# Patient Record
Sex: MALE | Race: BLACK OR AFRICAN AMERICAN | NOT HISPANIC OR LATINO | Employment: FULL TIME | URBAN - METROPOLITAN AREA
[De-identification: names, ages, dates, MRNs, and addresses within clinical notes are randomized per-mention and may not be internally consistent; named-entity substitution may affect disease eponyms.]

---

## 2023-09-06 ENCOUNTER — OFFICE VISIT (OUTPATIENT)
Dept: INTERNAL MEDICINE | Facility: CLINIC | Age: 63
End: 2023-09-06

## 2023-09-06 VITALS
HEART RATE: 81 BPM | OXYGEN SATURATION: 96 % | WEIGHT: 281.6 LBS | HEIGHT: 75 IN | SYSTOLIC BLOOD PRESSURE: 142 MMHG | DIASTOLIC BLOOD PRESSURE: 84 MMHG | TEMPERATURE: 97.1 F | BODY MASS INDEX: 35.01 KG/M2 | RESPIRATION RATE: 18 BRPM

## 2023-09-06 DIAGNOSIS — M54.6 CHRONIC BILATERAL THORACIC BACK PAIN: ICD-10-CM

## 2023-09-06 DIAGNOSIS — F32.89 OTHER DEPRESSION: ICD-10-CM

## 2023-09-06 DIAGNOSIS — I10 PRIMARY HYPERTENSION: ICD-10-CM

## 2023-09-06 DIAGNOSIS — M79.641 RIGHT HAND PAIN: ICD-10-CM

## 2023-09-06 DIAGNOSIS — Z12.11 COLON CANCER SCREENING: ICD-10-CM

## 2023-09-06 DIAGNOSIS — M10.9 GOUT, UNSPECIFIED CAUSE, UNSPECIFIED CHRONICITY, UNSPECIFIED SITE: ICD-10-CM

## 2023-09-06 DIAGNOSIS — G89.29 CHRONIC BILATERAL THORACIC BACK PAIN: ICD-10-CM

## 2023-09-06 DIAGNOSIS — Z00.00 ENCOUNTER FOR MEDICAL EXAMINATION TO ESTABLISH CARE: Primary | ICD-10-CM

## 2023-09-06 RX ORDER — MOMETASONE FUROATE AND FORMOTEROL FUMARATE DIHYDRATE 100; 5 UG/1; UG/1
1 AEROSOL RESPIRATORY (INHALATION)
Qty: 13 G | Refills: 1 | Status: SHIPPED | OUTPATIENT
Start: 2023-09-06

## 2023-09-06 RX ORDER — MOMETASONE FUROATE AND FORMOTEROL FUMARATE DIHYDRATE 100; 5 UG/1; UG/1
1 AEROSOL RESPIRATORY (INHALATION)
COMMUNITY
Start: 2018-03-06 | End: 2023-09-06 | Stop reason: SDUPTHER

## 2023-09-06 RX ORDER — ALLOPURINOL 300 MG/1
300 TABLET ORAL DAILY
Qty: 90 TABLET | Refills: 1 | Status: SHIPPED | OUTPATIENT
Start: 2023-09-06
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
## 2023-09-25 PROBLEM — I10 PRIMARY HYPERTENSION: Status: ACTIVE | Noted: 2023-09-25

## 2023-09-25 PROBLEM — M10.9 GOUT: Status: ACTIVE | Noted: 2023-09-25

## 2023-09-25 PROBLEM — M79.641 RIGHT HAND PAIN: Status: ACTIVE | Noted: 2023-09-25

## 2023-09-25 PROBLEM — M54.6 CHRONIC BILATERAL THORACIC BACK PAIN: Status: ACTIVE | Noted: 2023-09-25

## 2023-09-25 PROBLEM — G89.29 CHRONIC BILATERAL THORACIC BACK PAIN: Status: ACTIVE | Noted: 2023-09-25

## 2023-09-25 PROBLEM — F32.A DEPRESSION: Status: ACTIVE | Noted: 2023-09-25

## 2023-10-25 ENCOUNTER — OFFICE VISIT (OUTPATIENT)
Dept: INTERNAL MEDICINE | Facility: CLINIC | Age: 63
End: 2023-10-25
Payer: COMMERCIAL

## 2023-10-25 VITALS
DIASTOLIC BLOOD PRESSURE: 84 MMHG | TEMPERATURE: 97.9 F | WEIGHT: 273.2 LBS | HEART RATE: 80 BPM | BODY MASS INDEX: 33.97 KG/M2 | SYSTOLIC BLOOD PRESSURE: 130 MMHG | HEIGHT: 75 IN | OXYGEN SATURATION: 97 %

## 2023-10-25 DIAGNOSIS — Z23 IMMUNIZATION DUE: ICD-10-CM

## 2023-10-25 DIAGNOSIS — I10 PRIMARY HYPERTENSION: Primary | ICD-10-CM

## 2023-10-25 DIAGNOSIS — M79.641 RIGHT HAND PAIN: ICD-10-CM

## 2023-10-25 RX ORDER — LOSARTAN POTASSIUM 100 MG/1
100 TABLET ORAL DAILY
Qty: 90 TABLET | Refills: 1 | Status: SHIPPED | OUTPATIENT
Start: 2023-10-25

## 2023-10-25 NOTE — PROGRESS NOTES
Chief Complaint  Hypertension    Subjective            Eva Casas presents to [facility]    Hypertension :  Chronic, in good control.   He does check his bp at home, sbp runs in the high 120's/<85. [facility]     Right hand pain:  Subacute, referred to physical therapy, in for 3 weeks.   States when he goes to reach he no longer has the shooting pain that goes up his arms.   States he still gets the numbness over the tips of his thumb and index finger.     Chronic back pain:  Pt was referred to pain management as well at last visit.   Had trigger point injection, and has follow up appointment.         Past Medical History:   Diagnosis Date    Borderline diabetic     Borderline high cholesterol     Gout     Hypertension        Allergies:   No Known Allergies       History reviewed. No pertinent surgical history.       Social History     Socioeconomic History    Marital status:    Tobacco Use    Smoking status: Never     Passive exposure: Never    Smokeless tobacco: Never   Vaping Use    Vaping Use: Never used   Substance and Sexual Activity    Alcohol use: Never    Drug use: Never    Sexual activity: Defer         History reviewed. No pertinent family history.       Health Maintenance Due   Topic Date Due    COLORECTAL CANCER SCREENING  Never done    TDAP/TD VACCINES (1 - Tdap) Never done    ZOSTER VACCINE (1 of 2) Never done    COVID-19 Vaccine (3 - 2023-24 season) 09/01/2023    HEPATITIS C SCREENING  Never done    ANNUAL PHYSICAL  Never done            Current Outpatient Medications:     allopurinol (ZYLOPRIM) 300 MG tablet, Take 1 tablet by mouth Daily., Disp: 90 tablet, Rfl: 1    atorvastatin (LIPITOR) 10 MG tablet, Take 1 tablet by mouth Daily., Disp: , Rfl:     cetirizine (zyrTEC) 10 MG tablet, Take 1 tablet by mouth Daily., Disp: , Rfl:     cholecalciferol (VITAMIN D3) 10 MCG (400 UNIT) tablet, Take 1 tablet by mouth  "Daily., Disp: , Rfl:     colchicine 0.6 MG tablet, Take 1 tablet by mouth Daily., Disp: , Rfl:     folic acid (FOLVITE) 1 MG tablet, Take 1 tablet by mouth Daily., Disp: , Rfl:     losartan (COZAAR) 100 MG tablet, Take 1 tablet by mouth Daily., Disp: 90 tablet, Rfl: 1    metFORMIN (GLUCOPHAGE) 500 MG tablet, Take 1 tablet by mouth 2 (Two) Times a Day With Meals., Disp: , Rfl:     mometasone-formoterol (Dulera) 100-5 MCG/ACT inhaler, Inhale 1 puff 2 (Two) Times a Day., Disp: 13 g, Rfl: 1    triamterene-hydrochlorothiazide (MAXZIDE) 75-50 MG per tablet, Take 1 tablet by mouth Daily., Disp: , Rfl:     erythromycin (ROMYCIN) 5 MG/GM ophthalmic ointment, Administer  to the right eye Every 6 (Six) Hours While Awake for 7 days., Disp: 1 g, Rfl: 0      Immunization History   Administered Date(s) Administered    COVID-19 (PFIZER) Purple Cap Monovalent 03/29/2021, 04/17/2021    Fluzone (or Fluarix & Flulaval for VFC) >6mos 10/25/2023         Review of Systems       Objective       Vitals:    10/25/23 1528   BP: 130/84   Pulse: 80   Temp: 97.9 °F (36.6 °C)   SpO2: 97%   Weight: 124 kg (273 lb 3.2 oz)   Height: 190.5 cm (75\")     Body mass index is 34.15 kg/m².      Physical Exam  Vitals reviewed.   Constitutional:       Appearance: Normal appearance.   HENT:      Head: Normocephalic and atraumatic.      Nose: Nose normal.   Eyes:      Extraocular Movements: Extraocular movements intact.      Conjunctiva/sclera: Conjunctivae normal.   Cardiovascular:      Rate and Rhythm: Normal rate and regular rhythm.      Pulses: Normal pulses.      Heart sounds: Normal heart sounds.   Pulmonary:      Effort: Pulmonary effort is normal. No respiratory distress.      Breath sounds: Normal breath sounds.   Musculoskeletal:         General: Normal range of motion.   Skin:     General: Skin is warm and dry.   Neurological:      General: No focal deficit present.      Mental Status: He is alert and oriented to person, place, and time.      " Cranial Nerves: No cranial nerve deficit.   Psychiatric:         Mood and Affect: Mood normal.         Behavior: Behavior normal.         Thought Content: Thought content normal.             Result Review :   [facility]:  A1c:7  Cr. Of 1.47  GFR of 54.  PSA: 0.830           Assessment and Plan      Diagnoses and all orders for this visit:    1. Primary hypertension (Primary)  Comments:  Chronic, in good control.  Orders:  -     losartan (COZAAR) 100 MG tablet; Take 1 tablet by mouth Daily.  Dispense: 90 tablet; Refill: 1  -     Comprehensive metabolic panel; Future    2. Right hand pain  Comments:  Chronic w/ some improvement. Continue physical therapy.    3. Immunization due  Comments:  Administered in office and pt tolerated well.  Pt also due for Shingrix which was discussed, and pt advised to have this done at local pharmacy.   Orders:  -     Fluzone (or Fluarix & Flulaval for VFC) >6mos  -     Cancel: Shingrix Vaccine                  Follow Up     Return in about 3 months (around 1/25/2024).    Patient was given instructions and counseling regarding his condition or for health maintenance advice. Please see specific information pulled into the AVS if appropriate.     Amy Campbell MD   Internal Medicine-Pediatrics

## 2023-12-06 ENCOUNTER — CLINICAL SUPPORT (OUTPATIENT)
Dept: INTERNAL MEDICINE | Facility: CLINIC | Age: 63
End: 2023-12-06
Payer: COMMERCIAL

## 2023-12-06 DIAGNOSIS — I10 PRIMARY HYPERTENSION: ICD-10-CM

## 2023-12-06 DIAGNOSIS — Z01.89 ENCOUNTER FOR LABORATORY TEST: Primary | ICD-10-CM

## 2023-12-06 LAB
ALBUMIN SERPL-MCNC: 4.3 G/DL (ref 3.5–5.2)
ALBUMIN/GLOB SERPL: 1.2 G/DL
ALP SERPL-CCNC: 88 U/L (ref 39–117)
ALT SERPL W P-5'-P-CCNC: 41 U/L (ref 1–41)
ANION GAP SERPL CALCULATED.3IONS-SCNC: 9.6 MMOL/L (ref 5–15)
AST SERPL-CCNC: 26 U/L (ref 1–40)
BILIRUB SERPL-MCNC: 0.4 MG/DL (ref 0–1.2)
BUN SERPL-MCNC: 13 MG/DL (ref 8–23)
BUN/CREAT SERPL: 10.2 (ref 7–25)
CALCIUM SPEC-SCNC: 9.9 MG/DL (ref 8.6–10.5)
CHLORIDE SERPL-SCNC: 103 MMOL/L (ref 98–107)
CO2 SERPL-SCNC: 27.4 MMOL/L (ref 22–29)
CREAT SERPL-MCNC: 1.28 MG/DL (ref 0.76–1.27)
EGFRCR SERPLBLD CKD-EPI 2021: 63.7 ML/MIN/1.73
GLOBULIN UR ELPH-MCNC: 3.7 GM/DL
GLUCOSE SERPL-MCNC: 113 MG/DL (ref 65–99)
POTASSIUM SERPL-SCNC: 4.7 MMOL/L (ref 3.5–5.2)
PROT SERPL-MCNC: 8 G/DL (ref 6–8.5)
SODIUM SERPL-SCNC: 140 MMOL/L (ref 136–145)

## 2023-12-06 PROCEDURE — 36415 COLL VENOUS BLD VENIPUNCTURE: CPT | Performed by: STUDENT IN AN ORGANIZED HEALTH CARE EDUCATION/TRAINING PROGRAM

## 2023-12-06 PROCEDURE — 80053 COMPREHEN METABOLIC PANEL: CPT | Performed by: STUDENT IN AN ORGANIZED HEALTH CARE EDUCATION/TRAINING PROGRAM

## 2023-12-06 NOTE — PROGRESS NOTES
Venipuncture Blood Specimen Collection  Venipuncture performed in [facility] by Katherine Ozuna RN with good hemostasis. Patient tolerated the procedure well without complications.   12/06/23   Katherine Ozuna RN

## 2024-08-16 PROCEDURE — 87081 CULTURE SCREEN ONLY: CPT | Performed by: NURSE PRACTITIONER

## 2024-08-16 PROCEDURE — 87635 SARS-COV-2 COVID-19 AMP PRB: CPT | Performed by: NURSE PRACTITIONER

## 2024-11-25 ENCOUNTER — HOSPITAL ENCOUNTER (EMERGENCY)
Facility: HOSPITAL | Age: 64
Discharge: HOME | End: 2024-11-26
Attending: EMERGENCY MEDICINE | Admitting: EMERGENCY MEDICINE
Payer: COMMERCIAL

## 2024-11-25 DIAGNOSIS — E11.65 HYPERGLYCEMIA DUE TO DIABETES MELLITUS: Primary | ICD-10-CM

## 2024-11-25 LAB
ALBUMIN SERPL-MCNC: 4.4 G/DL (ref 3.5–5.2)
ALBUMIN/GLOB SERPL: 1.1 G/DL
ALP SERPL-CCNC: 123 U/L (ref 39–117)
ALT SERPL W P-5'-P-CCNC: 20 U/L (ref 1–41)
ANION GAP SERPL CALCULATED.3IONS-SCNC: 14.2 MMOL/L (ref 5–15)
AST SERPL-CCNC: 19 U/L (ref 1–40)
BASOPHILS # BLD AUTO: 0.04 10*3/MM3 (ref 0–0.2)
BASOPHILS NFR BLD AUTO: 0.5 % (ref 0–1.5)
BILIRUB SERPL-MCNC: 0.5 MG/DL (ref 0–1.2)
BILIRUB UR QL STRIP: NEGATIVE
BUN SERPL-MCNC: 31 MG/DL (ref 8–23)
BUN/CREAT SERPL: 19.6 (ref 7–25)
CALCIUM SPEC-SCNC: 9.4 MG/DL (ref 8.6–10.5)
CHLORIDE SERPL-SCNC: 91 MMOL/L (ref 98–107)
CLARITY UR: CLEAR
CO2 SERPL-SCNC: 22.8 MMOL/L (ref 22–29)
COLOR UR: YELLOW
CREAT SERPL-MCNC: 1.58 MG/DL (ref 0.76–1.27)
DEPRECATED RDW RBC AUTO: 39.6 FL (ref 37–54)
EGFRCR SERPLBLD CKD-EPI 2021: 49.2 ML/MIN/1.73
EOSINOPHIL # BLD AUTO: 0.05 10*3/MM3 (ref 0–0.4)
EOSINOPHIL NFR BLD AUTO: 0.6 % (ref 0.3–6.2)
ERYTHROCYTE [DISTWIDTH] IN BLOOD BY AUTOMATED COUNT: 12.9 % (ref 12.3–15.4)
GLOBULIN UR ELPH-MCNC: 3.9 GM/DL
GLUCOSE BLDC GLUCOMTR-MCNC: 398 MG/DL (ref 70–99)
GLUCOSE BLDC GLUCOMTR-MCNC: 563 MG/DL (ref 70–99)
GLUCOSE SERPL-MCNC: 555 MG/DL (ref 65–99)
GLUCOSE UR STRIP-MCNC: ABNORMAL MG/DL
HCT VFR BLD AUTO: 48.3 % (ref 37.5–51)
HGB BLD-MCNC: 15.9 G/DL (ref 13–17.7)
HGB UR QL STRIP.AUTO: NEGATIVE
HOLD SPECIMEN: NORMAL
HOLD SPECIMEN: NORMAL
IMM GRANULOCYTES # BLD AUTO: 0.02 10*3/MM3 (ref 0–0.05)
IMM GRANULOCYTES NFR BLD AUTO: 0.3 % (ref 0–0.5)
KETONES UR QL STRIP: ABNORMAL
LEUKOCYTE ESTERASE UR QL STRIP.AUTO: NEGATIVE
LYMPHOCYTES # BLD AUTO: 2.34 10*3/MM3 (ref 0.7–3.1)
LYMPHOCYTES NFR BLD AUTO: 29.3 % (ref 19.6–45.3)
MCH RBC QN AUTO: 27.9 PG (ref 26.6–33)
MCHC RBC AUTO-ENTMCNC: 32.9 G/DL (ref 31.5–35.7)
MCV RBC AUTO: 84.7 FL (ref 79–97)
MONOCYTES # BLD AUTO: 0.7 10*3/MM3 (ref 0.1–0.9)
MONOCYTES NFR BLD AUTO: 8.8 % (ref 5–12)
NEUTROPHILS NFR BLD AUTO: 4.85 10*3/MM3 (ref 1.7–7)
NEUTROPHILS NFR BLD AUTO: 60.5 % (ref 42.7–76)
NITRITE UR QL STRIP: NEGATIVE
NRBC BLD AUTO-RTO: 0 /100 WBC (ref 0–0.2)
PH UR STRIP.AUTO: 5.5 [PH] (ref 5–8)
PLATELET # BLD AUTO: 262 10*3/MM3 (ref 140–450)
PMV BLD AUTO: 11.1 FL (ref 6–12)
POTASSIUM SERPL-SCNC: 4.3 MMOL/L (ref 3.5–5.2)
PROT SERPL-MCNC: 8.3 G/DL (ref 6–8.5)
PROT UR QL STRIP: ABNORMAL
RBC # BLD AUTO: 5.7 10*6/MM3 (ref 4.14–5.8)
SODIUM SERPL-SCNC: 128 MMOL/L (ref 136–145)
SP GR UR STRIP: 1.03 (ref 1–1.03)
UROBILINOGEN UR QL STRIP: ABNORMAL
WBC NRBC COR # BLD AUTO: 8 10*3/MM3 (ref 3.4–10.8)
WHOLE BLOOD HOLD COAG: NORMAL
WHOLE BLOOD HOLD SPECIMEN: NORMAL

## 2024-11-25 PROCEDURE — 63710000001 INSULIN REGULAR HUMAN PER 5 UNITS: Performed by: EMERGENCY MEDICINE

## 2024-11-25 PROCEDURE — 25810000003 SODIUM CHLORIDE 0.9 % SOLUTION

## 2024-11-25 PROCEDURE — 82948 REAGENT STRIP/BLOOD GLUCOSE: CPT | Performed by: EMERGENCY MEDICINE

## 2024-11-25 PROCEDURE — 81003 URINALYSIS AUTO W/O SCOPE: CPT | Performed by: EMERGENCY MEDICINE

## 2024-11-25 PROCEDURE — 80053 COMPREHEN METABOLIC PANEL: CPT | Performed by: EMERGENCY MEDICINE

## 2024-11-25 PROCEDURE — 85025 COMPLETE CBC W/AUTO DIFF WBC: CPT | Performed by: EMERGENCY MEDICINE

## 2024-11-25 PROCEDURE — 25810000003 SODIUM CHLORIDE 0.9 % SOLUTION: Performed by: EMERGENCY MEDICINE

## 2024-11-25 PROCEDURE — 82948 REAGENT STRIP/BLOOD GLUCOSE: CPT

## 2024-11-25 PROCEDURE — 63710000001 INSULIN REGULAR HUMAN PER 5 UNITS

## 2024-11-25 PROCEDURE — 99283 EMERGENCY DEPT VISIT LOW MDM: CPT

## 2024-11-25 RX ORDER — SODIUM CHLORIDE 0.9 % (FLUSH) 0.9 %
10 SYRINGE (ML) INJECTION AS NEEDED
Status: DISCONTINUED | OUTPATIENT
Start: 2024-11-25 | End: 2024-11-26 | Stop reason: HOSPADM

## 2024-11-25 RX ADMIN — INSULIN HUMAN 7 UNITS: 100 INJECTION, SOLUTION PARENTERAL at 23:46

## 2024-11-25 RX ADMIN — SODIUM CHLORIDE 1000 ML: 9 INJECTION, SOLUTION INTRAVENOUS at 21:23

## 2024-11-25 RX ADMIN — INSULIN HUMAN 7 UNITS: 100 INJECTION, SOLUTION PARENTERAL at 21:43

## 2024-11-25 RX ADMIN — SODIUM CHLORIDE 1000 ML: 9 INJECTION, SOLUTION INTRAVENOUS at 23:46

## 2024-11-26 VITALS
TEMPERATURE: 97.8 F | DIASTOLIC BLOOD PRESSURE: 97 MMHG | HEIGHT: 75 IN | OXYGEN SATURATION: 94 % | WEIGHT: 236.99 LBS | RESPIRATION RATE: 18 BRPM | BODY MASS INDEX: 29.47 KG/M2 | SYSTOLIC BLOOD PRESSURE: 130 MMHG | HEART RATE: 78 BPM

## 2024-11-26 LAB — GLUCOSE BLDC GLUCOMTR-MCNC: 273 MG/DL (ref 70–99)

## 2024-11-26 PROCEDURE — 82948 REAGENT STRIP/BLOOD GLUCOSE: CPT | Performed by: EMERGENCY MEDICINE

## 2024-11-26 NOTE — ED PROVIDER NOTES
Time: 8:24 PM EST  Date of encounter:  11/25/2024  Independent Historian/Clinical History and Information was obtained by:   Patient    History is limited by: N/A    Chief Complaint   Patient presents with    Hyperglycemia     Patient reports he was called by  that BG was 517 and other abnormal labs that he is unsure of .  In triage .  Patient reports dry mouth and increased for past month.  Takes Metformin.         History of Present Illness:  Patient is a 64 y.o. year old male who presents to the emergency department for evaluation of Hyperglycemia.  Patient states he had outpatient labs done with plan to follow-up with his primary care provider next week.  He states he was found to have an elevated blood sugar and was told he needs to come to the emergency department for evaluation. Patient states that recently he has been losing weight without trying, having increased urination, and having a dry mouth.  Patient has been on metformin for borderline diabetes but has not been diagnosed with full-blown diabetes.(Bailey Seaver, APRN, FNP-C)    Patient Care Team  Primary Care Provider: Amy Campbell MD    Past Medical History:     No Known Allergies  Past Medical History:   Diagnosis Date    Borderline diabetic     Borderline high cholesterol     Gout     Hypertension      History reviewed. No pertinent surgical history.  History reviewed. No pertinent family history.    Home Medications:  Prior to Admission medications    Medication Sig Start Date End Date Taking? Authorizing Provider   allopurinol (ZYLOPRIM) 300 MG tablet Take 1 tablet by mouth Daily. 9/6/23   Amy Campbell MD   atorvastatin (LIPITOR) 10 MG tablet Take 1 tablet by mouth Daily.    Provider, MD Evgeny   azithromycin (Zithromax Z-Jose) 250 MG tablet Take 2 PO today then take 1 daily on days 2-5 8/16/24   Melissa Garcia APRN   brompheniramine-pseudoephedrine-DM 30-2-10 MG/5ML syrup Take 10 mL by mouth 4 (Four) Times a Day As  Needed for Congestion, Cough or Allergies. 8/16/24   Melissa Garcia APRN   cetirizine (zyrTEC) 10 MG tablet Take 1 tablet by mouth Daily.    Evgeny Sarmiento MD   cholecalciferol (VITAMIN D3) 10 MCG (400 UNIT) tablet Take 1 tablet by mouth Daily.    Evgeny Sarmiento MD   colchicine 0.6 MG tablet Take 1 tablet by mouth Daily.    Evgeny Sarmiento MD   folic acid (FOLVITE) 1 MG tablet Take 1 tablet by mouth Daily.    Evgeny Sarmiento MD   losartan (COZAAR) 100 MG tablet Take 1 tablet by mouth Daily.    Evgeny Sarmiento MD   metFORMIN ER (GLUCOPHAGE-XR) 500 MG 24 hr tablet TAKE 1 TABLET BY MOUTH ONCE DAILY WITH FOOD FOR 7 DAYS,THEN INCREASE TO 2 TABLETS DAILY    Evgeny Sarmiento MD   mometasone-formoterol (Dulera) 100-5 MCG/ACT inhaler Inhale 1 puff 2 (Two) Times a Day. 9/6/23   Amy Campbell MD   predniSONE (DELTASONE) 10 MG (21) dose pack Use as directed on package 7/28/24   Lasha Mckinney DO   triamterene-hydrochlorothiazide (MAXZIDE) 75-50 MG per tablet Take 1 tablet by mouth Daily.    Evgeny Sarmiento MD        Social History:   Social History     Tobacco Use    Smoking status: Never     Passive exposure: Never    Smokeless tobacco: Never   Vaping Use    Vaping status: Never Used   Substance Use Topics    Alcohol use: Never    Drug use: Never         Review of Systems:  Review of Systems   Constitutional:  Positive for fatigue. Negative for chills and fever.   HENT:  Negative for congestion, ear pain and sore throat.    Eyes:  Negative for pain.   Respiratory:  Negative for cough, chest tightness and shortness of breath.    Cardiovascular:  Negative for chest pain.   Gastrointestinal:  Negative for abdominal pain, diarrhea, nausea and vomiting.   Endocrine: Positive for polydipsia and polyuria.   Genitourinary:  Negative for flank pain and hematuria.   Musculoskeletal:  Negative for joint swelling.   Skin:  Negative for pallor.   Neurological:  Negative for seizures  "and headaches.   All other systems reviewed and are negative.       Physical Exam:  /100   Pulse 81   Temp 97.8 °F (36.6 °C) (Oral)   Resp 18   Ht 190.5 cm (75\")   Wt 107 kg (236 lb 15.9 oz)   SpO2 94%   BMI 29.62 kg/m²         Physical Exam  Constitutional:       Appearance: Normal appearance.   HENT:      Head: Normocephalic and atraumatic.      Nose: Nose normal.      Mouth/Throat:      Mouth: Mucous membranes are moist.   Eyes:      Extraocular Movements: Extraocular movements intact.      Conjunctiva/sclera: Conjunctivae normal.      Pupils: Pupils are equal, round, and reactive to light.   Cardiovascular:      Rate and Rhythm: Normal rate and regular rhythm.      Pulses: Normal pulses.      Heart sounds: Normal heart sounds.   Pulmonary:      Effort: Pulmonary effort is normal.      Breath sounds: Normal breath sounds.   Abdominal:      General: There is no distension.      Palpations: Abdomen is soft.      Tenderness: There is no abdominal tenderness.   Musculoskeletal:         General: Normal range of motion.      Cervical back: Normal range of motion.   Skin:     General: Skin is warm and dry.      Capillary Refill: Capillary refill takes less than 2 seconds.      Coloration: Skin is not cyanotic.   Neurological:      General: No focal deficit present.      Mental Status: He is alert and oriented to person, place, and time. Mental status is at baseline.   Psychiatric:         Attention and Perception: Attention and perception normal.         Mood and Affect: Mood normal.         Behavior: Behavior normal.                    Procedures:  Procedures      Medical Decision Making:      Comorbidities that affect care:    Diabetes, Hypertension    External Notes reviewed:    Previous Clinic Note: Patient seen by his PCP on 10/25/2023 for hypertension, right hand pain and immunizations due      The following orders were placed and all results were independently analyzed by me:  Orders Placed This " Encounter   Procedures    [place] Draw    Comprehensive Metabolic Panel    Urinalysis With Microscopic If Indicated (No Culture) - Urine, Clean Catch    CBC Auto Differential    NPO Diet NPO Type: Strict NPO    Undress & Gown    Continuous Pulse Oximetry    Vital Signs    Oxygen Therapy- Nasal Cannula; Titrate 1-6 LPM Per SpO2; 90 - 95%    POC Glucose Q1H    POC Glucose Once    POC Glucose STAT    Insert Peripheral IV    CBC & Differential    Green Top (Gel)    Lavender Top    Gold Top - SST    Light Blue Top       Medications Given in the Emergency Department:  Medications   sodium chloride 0.9 % flush 10 mL (has no administration in time range)   sodium chloride 0.9 % bolus 1,000 mL (0 mL Intravenous Stopped 11/25/24 2342)   insulin regular (humuLIN R,novoLIN R) injection 7 Units (7 Units Intravenous Given 11/25/24 2143)   sodium chloride 0.9 % bolus 1,000 mL (1,000 mL Intravenous New Bag 11/25/24 2346)   insulin regular (humuLIN R,novoLIN R) injection 7 Units (7 Units Intravenous Given 11/25/24 2346)        ED Course:    The patient was initially evaluated in the triage area where orders were placed. The patient was later dispositioned by Mercedes Gimenez MD.      The patient was advised to stay for completion of workup which includes but is not limited to communication of labs and radiological results, reassessment and plan. The patient was advised that leaving prior to disposition by a provider could result in critical findings that are not communicated to the patient.     ED Course as of 11/26/24 0102   Mon Nov 25, 2024 2025 PROVIDER IN TRIAGE  Patient was evaluated by me in triage, Bailey Seaver, APRN, FNP-C.  Orders were placed and patient is currently awaiting final results and disposition.   [AS]      ED Course User Index  [AS] Seaver, Alyce B, APRN       Labs:    Lab Results (last 24 hours)       Procedure Component Value Units Date/Time    POC Glucose Once [[id number]]  (Abnormal) Collected: 11/25/24  2023    Specimen: Blood Updated: 11/25/24 2031     Glucose 563 mg/dL      Comment: Serial Number: [id number]:  [id number]       Urinalysis With Microscopic If Indicated (No Culture) - Urine, Clean Catch [[id number]]  (Abnormal) Collected: 11/25/24 2104    Specimen: Urine, Clean Catch Updated: 11/25/24 2126     Color, UA Yellow     Appearance, UA Clear     pH, UA 5.5     Specific Gravity, UA 1.029     Glucose, UA >=1000 mg/dL (3+)     Ketones, UA Trace     Bilirubin, UA Negative     Blood, UA Negative     Protein, UA Trace     Leuk Esterase, UA Negative     Nitrite, UA Negative     Urobilinogen, UA 0.2 E.U./dL    Narrative:      Urine microscopic not indicated.    CBC & Differential [[id number]]  (Normal) Collected: 11/25/24 2113    Specimen: Blood Updated: 11/25/24 2122    Narrative:      The following orders were created for panel order CBC & Differential.  Procedure                               Abnormality         Status                     ---------                               -----------         ------                     CBC Auto Differential[[id number]]        Normal              Final result                 Please view results for these tests on the individual orders.    Comprehensive Metabolic Panel [[id number]]  (Abnormal) Collected: 11/25/24 2113    Specimen: Blood Updated: 11/25/24 2148     Glucose 555 mg/dL      BUN 31 mg/dL      Creatinine 1.58 mg/dL      Sodium 128 mmol/L      Potassium 4.3 mmol/L      Chloride 91 mmol/L      CO2 22.8 mmol/L      Calcium 9.4 mg/dL      Total Protein 8.3 g/dL      Albumin 4.4 g/dL      ALT (SGPT) 20 U/L      AST (SGOT) 19 U/L      Alkaline Phosphatase 123 U/L      Total Bilirubin 0.5 mg/dL      Globulin 3.9 gm/dL      A/G Ratio 1.1 g/dL      BUN/Creatinine Ratio 19.6     Anion Gap 14.2 mmol/L      eGFR 49.2 mL/min/1.73     Narrative:      GFR Normal >60  Chronic Kidney Disease <60  Kidney Failure <15      CBC Auto Differential [[id number]]  (Normal) Collected:  11/25/24 2113    Specimen: Blood Updated: 11/25/24 2122     WBC 8.00 10*3/mm3      RBC 5.70 10*6/mm3      Hemoglobin 15.9 g/dL      Hematocrit 48.3 %      MCV 84.7 fL      MCH 27.9 pg      MCHC 32.9 g/dL      RDW 12.9 %      RDW-SD 39.6 fl      MPV 11.1 fL      Platelets 262 10*3/mm3      Neutrophil % 60.5 %      Lymphocyte % 29.3 %      Monocyte % 8.8 %      Eosinophil % 0.6 %      Basophil % 0.5 %      Immature Grans % 0.3 %      Neutrophils, Absolute 4.85 10*3/mm3      Lymphocytes, Absolute 2.34 10*3/mm3      Monocytes, Absolute 0.70 10*3/mm3      Eosinophils, Absolute 0.05 10*3/mm3      Basophils, Absolute 0.04 10*3/mm3      Immature Grans, Absolute 0.02 10*3/mm3      nRBC 0.0 /100 WBC     POC Glucose Q1H [[id number]]  (Abnormal) Collected: 11/25/24 2253    Specimen: Blood Updated: 11/25/24 2255     Glucose 398 mg/dL      Comment: Serial Number: [id number]:  [id number]       POC Glucose Q1H [[id number]]  (Abnormal) Collected: 11/26/24 0046    Specimen: Blood Updated: 11/26/24 0049     Glucose 273 mg/dL      Comment: Serial Number: [id number]:  [id number]                Imaging:    No Radiology Exams Resulted Within Past 24 Hours      Differential Diagnosis and Discussion:      Metabolic: Differential diagnosis includes but is not limited to hypertension, hyperglycemia, hyperkalemia, hypocalcemia, metabolic acidosis, hypokalemia, hypoglycemia, malnutrition, hypothyroidism, hyperthyroidism, and adrenal insufficiency.     All labs were reviewed and interpreted by me.    MDM  Number of Diagnoses or Management Options  Diagnosis management comments: Patient is afebrile nontoxic-appearing.  Vital signs stable.  Labs did show elevated glucose of 555.  Patient given IV fluids and insulin.  No evidence of DKA.  Glucose did improve to 270.  Patient is currently only on a low-dose metformin.  Will increase dose.  Recommend he follows up tomorrow with his primary care provider to discuss diabetes management.   Recommend low carb diet.  Discussed return precautions, discharge instructions and answered all his questions.       Amount and/or Complexity of Data Reviewed  Clinical lab tests: reviewed  Tests in the radiology section of CPT®: reviewed  Review and summarize past medical records: yes  Independent visualization of images, tracings, or specimens: yes    Risk of Complications, Morbidity, and/or Mortality  Presenting problems: moderate  Management options: moderate                     Patient Care Considerations:    SEPSIS was considered but is NOT present in the emergency department as SIRS criteria is not present.      Consultants/Shared Management Plan:    None    Social Determinants of Health:    Patient is independent, reliable, and has access to care.       Disposition and Care Coordination:    Discharged: I considered escalation of care by admitting this patient to the hospital, however glucose improved and patient stable for follow up with his PCP    I have explained the patient´s condition, diagnoses and treatment plan based on the information available to me at this time. I have answered questions and addressed any concerns. The patient has a good  understanding of the patient´s diagnosis, condition, and treatment plan as can be expected at this point. The vital signs have been stable. The patient´s condition is stable and appropriate for discharge from the emergency department.      The patient will pursue further outpatient evaluation with the primary care physician or other designated or consulting physician as outlined in the discharge instructions. They are agreeable to this plan of care and follow-up instructions have been explained in detail. The patient has received these instructions in written format and has expressed an understanding of the discharge instructions. The patient is aware that any significant change in condition or worsening of symptoms should prompt an immediate return to this or the  closest emergency department or call to 911.  I have explained discharge medications and the need for follow up with the patient/caretakers. This was also printed in the discharge instructions. Patient was discharged with the following medications and follow up:      Medication List        New Prescriptions      metFORMIN 500 MG tablet  Commonly known as: GLUCOPHAGE  Take 2 tablets by mouth 2 (Two) Times a Day With Meals for 30 days.  Replaces: metFORMIN  MG 24 hr tablet            Stop      metFORMIN  MG 24 hr tablet  Commonly known as: GLUCOPHAGE-XR  Replaced by: metFORMIN 500 MG tablet               Where to Get Your Medications        These medications were sent to [facility]/pharmacy #[id number] - [place], [place] - [address] - [phone] [facility] [phone]  [address] [place] [address]      Hours: 24-hours Phone: [phone]   metFORMIN 500 MG tablet      Amy Campbell MD  [address] [address]  [phone]    In 1 day         Final diagnoses:   Hyperglycemia due to diabetes mellitus        ED Disposition       ED Disposition   Discharge    Condition   Stable    Comment   --               This medical record created using voice recognition software.             Mercedes Gimenez MD  11/26/24 0102

## 2024-11-27 ENCOUNTER — NURSE TRIAGE (OUTPATIENT)
Dept: CALL CENTER | Facility: HOSPITAL | Age: 64
End: 2024-11-27
Payer: COMMERCIAL

## 2024-11-27 ENCOUNTER — TELEPHONE (OUTPATIENT)
Dept: INTERNAL MEDICINE | Facility: CLINIC | Age: 64
End: 2024-11-27
Payer: COMMERCIAL

## 2024-11-27 ENCOUNTER — OFFICE VISIT (OUTPATIENT)
Dept: INTERNAL MEDICINE | Facility: CLINIC | Age: 64
End: 2024-11-27
Payer: COMMERCIAL

## 2024-11-27 VITALS
SYSTOLIC BLOOD PRESSURE: 122 MMHG | OXYGEN SATURATION: 95 % | BODY MASS INDEX: 29.77 KG/M2 | RESPIRATION RATE: 18 BRPM | WEIGHT: 239.4 LBS | TEMPERATURE: 97.7 F | DIASTOLIC BLOOD PRESSURE: 72 MMHG | HEIGHT: 75 IN | HEART RATE: 94 BPM

## 2024-11-27 DIAGNOSIS — E11.65 TYPE 2 DIABETES MELLITUS WITH HYPERGLYCEMIA, WITHOUT LONG-TERM CURRENT USE OF INSULIN: Primary | ICD-10-CM

## 2024-11-27 PROCEDURE — 3074F SYST BP LT 130 MM HG: CPT | Performed by: PHYSICIAN ASSISTANT

## 2024-11-27 PROCEDURE — 1159F MED LIST DOCD IN RCRD: CPT | Performed by: PHYSICIAN ASSISTANT

## 2024-11-27 PROCEDURE — 3078F DIAST BP <80 MM HG: CPT | Performed by: PHYSICIAN ASSISTANT

## 2024-11-27 PROCEDURE — 99214 OFFICE O/P EST MOD 30 MIN: CPT | Performed by: PHYSICIAN ASSISTANT

## 2024-11-27 PROCEDURE — 1160F RVW MEDS BY RX/DR IN RCRD: CPT | Performed by: PHYSICIAN ASSISTANT

## 2024-11-27 RX ORDER — ASPIRIN 81 MG/1
81 TABLET ORAL DAILY
COMMUNITY

## 2024-11-27 NOTE — PROGRESS NOTES
Chief Complaint  Diabetes and Hyperglycemia    Subjective          Eva Casas presents to [facility]  Diabetes      Pt here for diabetes  [facility] due to high bg  He does not know what his A1c was   In the er bg 555.   He got an accucheck guide meter yesterday  He has been taking metformin 500mg bid   Denies chest pain, palpitations, ha, dizziness, unilateral weakness, slurred speech   Am this am 490  He is open to insulin       Past Medical History:   Diagnosis Date    Borderline diabetic     Borderline high cholesterol     Gout     Hypertension         History reviewed. No pertinent surgical history.     Current Outpatient Medications on File Prior to Visit   Medication Sig Dispense Refill    allopurinol (ZYLOPRIM) 300 MG tablet Take 1 tablet by mouth Daily. 90 tablet 1    aspirin 81 MG EC tablet Take 1 tablet by mouth Daily.      atorvastatin (LIPITOR) 10 MG tablet Take 1 tablet by mouth Daily.      cetirizine (zyrTEC) 10 MG tablet Take 1 tablet by mouth Daily.      cholecalciferol (VITAMIN D3) 10 MCG (400 UNIT) tablet Take 1 tablet by mouth Daily.      colchicine 0.6 MG tablet Take 1 tablet by mouth Daily.      folic acid (FOLVITE) 1 MG tablet Take 1 tablet by mouth Daily.      losartan (COZAAR) 100 MG tablet Take 1 tablet by mouth Daily.      metFORMIN (GLUCOPHAGE) 500 MG tablet Take 2 tablets by mouth 2 (Two) Times a Day With Meals for 30 days. (Patient taking differently: Take 1 tablet by mouth 2 (Two) Times a Day With Meals.) 120 tablet 0    mometasone-formoterol (Dulera) 100-5 MCG/ACT inhaler Inhale 1 puff 2 (Two) Times a Day. 13 g 1    triamterene-hydrochlorothiazide (MAXZIDE) 75-50 MG per tablet Take 1 tablet by mouth Daily.      [DISCONTINUED] azithromycin (Zithromax Z-Jose) 250 MG tablet Take 2 PO today then take 1 daily on days 2-5 6 tablet 0    [DISCONTINUED] brompheniramine-pseudoephedrine-DM 30-2-10 MG/5ML  "syrup Take 10 mL by mouth 4 (Four) Times a Day As Needed for Congestion, Cough or Allergies. 240 mL 0    [DISCONTINUED] predniSONE (DELTASONE) 10 MG (21) dose pack Use as directed on package 21 tablet 0     No current facility-administered medications on file prior to visit.        No Known Allergies    Social History     Tobacco Use   Smoking Status Never    Passive exposure: Never   Smokeless Tobacco Never          Objective   Vital Signs:   /72 (BP Location: Right arm, Patient Position: Sitting, Cuff Size: Adult)   Pulse 94   Temp 97.7 °F (36.5 °C) (Temporal)   Resp 18   Ht 190.5 cm (75\")   Wt 109 kg (239 lb 6.4 oz)   SpO2 95%   BMI 29.92 kg/m²     Physical Exam  Vitals reviewed.   Constitutional:       Appearance: Normal appearance.   HENT:      Head: Normocephalic and atraumatic.      Nose: Nose normal.      Mouth/Throat:      Mouth: Mucous membranes are moist.   Eyes:      Extraocular Movements: Extraocular movements intact.      Conjunctiva/sclera: Conjunctivae normal.      Pupils: Pupils are equal, round, and reactive to light.   Cardiovascular:      Rate and Rhythm: Normal rate and regular rhythm.   Pulmonary:      Effort: Pulmonary effort is normal.      Breath sounds: Normal breath sounds.   Abdominal:      General: Abdomen is flat. Bowel sounds are normal.      Palpations: Abdomen is soft.   Musculoskeletal:         General: Normal range of motion.   Neurological:      General: No focal deficit present.      Mental Status: He is alert and oriented to person, place, and time.   Psychiatric:         Mood and Affect: Mood normal.        Result Review :   The following data was reviewed by: Karishma Paniagua PA-C on 11/27/2024:  Common labs          12/6/2023    09:08 11/25/2024    21:13   Common Labs   Glucose 113  555    BUN 13  31    Creatinine 1.28  1.58    Sodium 140  128    Potassium 4.7  4.3    Chloride 103  91    Calcium 9.9  9.4    Albumin 4.3  4.4    Total Bilirubin 0.4  0.5    Alkaline " Phosphatase 88  123    AST (SGOT) 26  19    ALT (SGPT) 41  20    WBC  8.00    Hemoglobin  15.9    Hematocrit  48.3    Platelets  262                    Assessment and Plan    Diagnoses and all orders for this visit:    1. Type 2 diabetes mellitus with hyperglycemia, without long-term current use of insulin (Primary)    Other orders  -     Discontinue: insulin degludec (TRESIBA FLEXTOUCH) 100 UNIT/ML solution pen-injector injection; Inject 15 Units under the skin into the appropriate area as directed Every Night.  Dispense: 30 mL; Refill: 1  -     insulin degludec (TRESIBA FLEXTOUCH) 100 UNIT/ML solution pen-injector injection; Inject 15 Units under the skin into the appropriate area as directed Every Night.  Dispense: 30 mL; Refill: 1  -     Insulin Pen Needle 29G X 12.7MM misc; Use 1 Pen Every Night.  Dispense: 90 each; Refill: 1    Reviewed ER note. [facility], [facility].  The best time to check blood sugar is first thing in the morning and the goal sugar is less than 120.Increase metformin to 1000mg bid. Most common side effect is muscle cramping in the abdomen and diarrhea, this usually improves 1-2 weeks after taking medicine daily. If symptoms do not improve let us know.  Will start basal insulin at 15 units. Will titrate up q3 days, pt give paper instructions. Return to clinic 1 wk with bg log to review.  Risks of blood sugar elevation include death, heart attack, stroke, kidney disease, blindness. Discussed importance of medication compliance and not missing doses. We will monitor at follow up and adjust medications if still elevated. To er if chest pain, palpitations, vision loss, unilateral weakness, altered mental status. Pt understands and agrees with plan.    I spent 30 minutes caring for Eva on this date of service. This time includes time spent by me in the following activities:preparing for the visit, reviewing tests, obtaining and/or reviewing a separately obtained  history, performing a medically appropriate examination and/or evaluation , counseling and educating the patient/family/caregiver, ordering medications, tests, or procedures, referring and communicating with other health care professionals , and documenting information in the medical record  Follow Up   Return in about 1 week (around 12/4/2024).  Patient was given instructions and counseling regarding his condition or for health maintenance advice. Please see specific information pulled into the AVS if appropriate.

## 2024-11-27 NOTE — TELEPHONE ENCOUNTER
"Seen in ER 2 days ago with blood glucose in the 500s. Was given insulin and Metformin dose increased. Blood glucose 491. Reviewed protocol with kiko. Connected patient with Fanny at Dr. Campbell's office.    Reason for Disposition   Blood glucose > 400 mg/dL (22.2 mmol/L)    Additional Information   Negative: Unconscious or difficult to awaken   Negative: Acting confused (e.g., disoriented, slurred speech)   Negative: Very weak (e.g., can't stand)   Negative: Sounds like a life-threatening emergency to the triager   Negative: [1] Vomiting AND [2] signs of dehydration (e.g., very dry mouth, lightheaded, dark urine)   Negative: [1] Blood glucose > 240 mg/dL (13.3 mmol/L) AND [2] rapid breathing   Negative: Blood glucose > 500 mg/dL (27.8 mmol/L)   Negative: [1] Blood glucose > 240 mg/dL (13.3 mmol/L) AND [2] urine ketones moderate-large (or more than 1+)   Negative: [1] Blood glucose > 240 mg/dL (13.3 mmol/L) AND [2] blood ketones > 1.4 mmol/L   Negative: [1] Blood glucose > 240 mg/dL (13.3 mmol/L) AND [2] vomiting AND [3] unable to check for ketones (in blood or urine)   Negative: [1] New-onset diabetes suspected (e.g., frequent urination, weak, weight loss) AND [2] vomiting or rapid breathing   Negative: Vomiting lasts > 4 hours   Negative: Patient sounds very sick or weak to the triager   Negative: Fever > 100.4 F (38.0 C)    Answer Assessment - Initial Assessment Questions  1. BLOOD GLUCOSE: \"What is your blood glucose level?\"       491  2. ONSET: \"When did you check the blood glucose?\"      This morning  3. USUAL RANGE: \"What is your glucose level usually?\" (e.g., usual fasting morning value, usual evening value)      < 200  4. KETONES: \"Do you check for ketones (urine or blood test strips)?\" If Yes, ask: \"What does the test show now?\"       No   5. TYPE 1 or 2:  \"Do you know what type of diabetes you have?\"  (e.g., Type 1, Type 2, Gestational; doesn't know)       Type 2  6. INSULIN: \"Do you take " "insulin?\" \"What type of insulin(s) do you use? What is the mode of delivery? (syringe, pen; injection or pump)?\"       No   7. DIABETES PILLS: \"Do you take any pills for your diabetes?\" If Yes, ask: \"Have you missed taking any pills recently?\"      Metformin   8. OTHER SYMPTOMS: \"Do you have any symptoms?\" (e.g., fever, frequent urination, difficulty breathing, dizziness, weakness, vomiting)      Dry mouth, funny taste in mouth  9. PREGNANCY: \"Is there any chance you are pregnant?\" \"When was your last menstrual period?\"      N/A    Protocols used: Diabetes - High Blood Sugar-ADULT-AH    "

## 2024-11-27 NOTE — PATIENT INSTRUCTIONS
Preventing Diabetes Mellitus Complications  You can help to prevent or slow down problems that are caused by diabetes (diabetes mellitus). If you follow your diabetes plan and take care of yourself, you can lower your chances of having severe problems.  What actions can I take to prevent problems caused by diabetes?  Managing your diabetes    Follow instructions from your health care providers about how to manage your diabetes. You may have a team of health care providers. They can teach you how to care for yourself and can answer any questions you have.  Learn about your condition. This can help you make healthy choices when it comes to eating and physical activity.  Know your target range for your blood sugar (glucose). Check your blood glucose levels. Your health care provider will help you decide how often to check your levels. How often you check may depend on your goals for treatment and how well you are meeting them.  Ask your health care provider if you should take a low dose of aspirin every day. Ask what dose is best for you. Taking a low dose of aspirin can help prevent heart disease.  Controlling your blood pressure and cholesterol  Your target blood pressure is based on:  Your age.  Your medicines.  How long you have had diabetes.  Other conditions you have.  Controlling your cholesterol may:  Help prevent heart disease and stroke.  Improve your blood flow.  To control your blood pressure and cholesterol:  Follow instructions from your health care provider about meal planning, exercise, and medicines.  Make sure your health care provider checks your blood pressure at every visit.  Monitor your blood pressure at home as told by your health care provider.  Have your cholesterol checked at least once a year.  A medicine called statin can help to lower your cholesterol. Ask your health care provider if you are or should be taking a statin.    Medical appointments and vaccines  Have yearly physical exams and  eye exams. Your health care providers will tell you how often you need to see them. It may depend on your diabetes plan.  Every visit with a health care provider should include a measure of:  Your weight.  Your blood pressure.  Your blood glucose.  Your A1C level should be checked:  At least 2 times a year, if you are meeting your treatment goals.  4 times a year, if you are not meeting treatment goals or if your goals have changed.  Your blood lipids (lipid profile) should be checked once a year. You should also be checked once a year for protein in your urine (urine microalbumin).  If you have type 1 diabetes, get an eye exam within 5 years after you are diagnosed. Get an exam once a year after that first exam.  If you have type 2 diabetes, get an eye exam as soon as you are diagnosed. Get an exam once a year after that first exam.  Keep your vaccines current. You should get:  A flu vaccine every year.  A pneumonia vaccine and a hepatitis B vaccine. If you are 65 years or older, you may get the pneumonia vaccine as a series of two shots.  Ask your health care provider what other vaccines you may need to get.  Keep all follow-up visits. This can help ensure that problems can be avoided or found early and treated.  Lifestyle  Do not use any products that contain nicotine or tobacco. These products include cigarettes, chewing tobacco, and vaping devices, such as e-cigarettes. If you need help quitting, ask your health care provider.  If you quit smoking, you may:  Lower your risk for heart attack, stroke, nerve disease, and kidney disease.  Help your blood move through your body better.  Help your blood pressure and cholesterol levels.  Do not drink alcohol if:  Your health care provider tells you not to drink.  You are pregnant, may be pregnant, or are planning to become pregnant.  If you drink alcohol:  Limit how much you have to:  0-1 drink a day for women.  0-2 drinks a day for men.  Know how much alcohol is in  your drink. In the [place] one drink equals one 12 oz bottle of beer (355 mL), one 5 oz glass of wine (148 mL), or one 1½ oz glass of hard liquor (44 mL).  Taking care of your feet  Diabetes may cause you to have poor blood flow to your legs and feet. It can also cause:  The skin on your feet to get thinner, break more easily, and heal more slowly.  Nerve damage in your legs and feet. This can result in less feeling. You may not notice small injuries. This could lead to bigger problems.  To avoid foot problems:  Check your skin and feet every day for cuts, bruises, redness, blisters, or sores.  Have a foot exam with your health care provider once a year. During the exam, your health care provider will:  Look at the structure and skin of your feet.  Check your pulses and amount of feeling in your feet.  Make sure that your health care provider does a visual foot exam at every visit.    Taking care of your teeth  People who have diabetes that is not controlled well are more likely to have gum disease (periodontal disease). Diabetes can make gum disease harder to control. If not treated, it can lead to tooth loss. To prevent this:  Brush your teeth twice a day.  Floss at least once a day.  Visit your dentist 2 times a year.  Managing stress  Living with diabetes can be stressful. When you are stressed, you may:  Have higher blood glucose because of stress hormones.  Be distracted from taking good care of yourself.  Be aware of your stress level and make changes to help you manage tough times. To lower your stress levels:  Think about joining a support group.  Do planned relaxation or meditation.  Do a hobby that you enjoy.  Maintain healthy relationships.  Try to exercise every day.  Work with your health care provider or a mental health professional.  Where to find more information  American Diabetes Association: diabetes.org  Association of Diabetes Care & Education Specialists: diabeteseducator.org  This information  is not intended to replace advice given to you by your health care provider. Make sure you discuss any questions you have with your health care provider.  Document Revised: 06/21/2023 Document Reviewed: 06/21/2023  Elsevier Patient Education © 2024 [employer].

## 2024-11-27 NOTE — TELEPHONE ENCOUNTER
Caller: Eva Casas    Relationship to patient: Self    Best call back number: [phone]     Patient is needing: PATIENT STATES THAT HIS TRUCIBA GOT REJECTED BUT HE GOT EVERYTHING STRAIGHTENED OUT WITH HIS PROVIDER AND WAS GIVVEN THIS NUMBER FOR THE OFFICE TO CALL FOR PASSPORT    NUMBER:[phone]

## 2024-12-04 ENCOUNTER — PRIOR AUTHORIZATION (OUTPATIENT)
Dept: INTERNAL MEDICINE | Facility: CLINIC | Age: 64
End: 2024-12-04
Payer: COMMERCIAL

## 2024-12-05 ENCOUNTER — OFFICE VISIT (OUTPATIENT)
Dept: INTERNAL MEDICINE | Facility: CLINIC | Age: 64
End: 2024-12-05
Payer: COMMERCIAL

## 2024-12-05 VITALS
TEMPERATURE: 97.6 F | DIASTOLIC BLOOD PRESSURE: 94 MMHG | HEIGHT: 75 IN | SYSTOLIC BLOOD PRESSURE: 140 MMHG | WEIGHT: 244.8 LBS | HEART RATE: 78 BPM | OXYGEN SATURATION: 98 % | BODY MASS INDEX: 30.44 KG/M2 | RESPIRATION RATE: 18 BRPM

## 2024-12-05 DIAGNOSIS — E11.65 TYPE 2 DIABETES MELLITUS WITH HYPERGLYCEMIA, WITHOUT LONG-TERM CURRENT USE OF INSULIN: Primary | ICD-10-CM

## 2024-12-05 DIAGNOSIS — I10 PRIMARY HYPERTENSION: ICD-10-CM

## 2024-12-05 DIAGNOSIS — Z11.59 SCREENING FOR VIRAL DISEASE: ICD-10-CM

## 2024-12-05 LAB
ALBUMIN SERPL-MCNC: 4 G/DL (ref 3.5–5.2)
ALBUMIN UR-MCNC: 6.3 MG/DL
ALBUMIN/GLOB SERPL: 1 G/DL
ALP SERPL-CCNC: 87 U/L (ref 39–117)
ALT SERPL W P-5'-P-CCNC: 24 U/L (ref 1–41)
ANION GAP SERPL CALCULATED.3IONS-SCNC: 8.7 MMOL/L (ref 5–15)
AST SERPL-CCNC: 16 U/L (ref 1–40)
BASOPHILS # BLD AUTO: 0.04 10*3/MM3 (ref 0–0.2)
BASOPHILS NFR BLD AUTO: 0.6 % (ref 0–1.5)
BILIRUB SERPL-MCNC: 0.4 MG/DL (ref 0–1.2)
BILIRUB UR QL STRIP: NEGATIVE
BUN SERPL-MCNC: 18 MG/DL (ref 8–23)
BUN/CREAT SERPL: 12.3 (ref 7–25)
CALCIUM SPEC-SCNC: 10 MG/DL (ref 8.6–10.5)
CHLORIDE SERPL-SCNC: 100 MMOL/L (ref 98–107)
CHOLEST SERPL-MCNC: 127 MG/DL (ref 0–200)
CLARITY UR: CLEAR
CO2 SERPL-SCNC: 26.3 MMOL/L (ref 22–29)
COLOR UR: YELLOW
CREAT SERPL-MCNC: 1.46 MG/DL (ref 0.76–1.27)
DEPRECATED RDW RBC AUTO: 39.1 FL (ref 37–54)
EGFRCR SERPLBLD CKD-EPI 2021: 54 ML/MIN/1.73
EOSINOPHIL # BLD AUTO: 0.12 10*3/MM3 (ref 0–0.4)
EOSINOPHIL NFR BLD AUTO: 1.8 % (ref 0.3–6.2)
ERYTHROCYTE [DISTWIDTH] IN BLOOD BY AUTOMATED COUNT: 12.8 % (ref 12.3–15.4)
GLOBULIN UR ELPH-MCNC: 4 GM/DL
GLUCOSE SERPL-MCNC: 168 MG/DL (ref 65–99)
GLUCOSE UR STRIP-MCNC: NEGATIVE MG/DL
HBA1C MFR BLD: 14.2 % (ref 4.8–5.6)
HCT VFR BLD AUTO: 43.1 % (ref 37.5–51)
HCV AB SER QL: NORMAL
HDLC SERPL-MCNC: 27 MG/DL (ref 40–60)
HGB BLD-MCNC: 14.3 G/DL (ref 13–17.7)
HGB UR QL STRIP.AUTO: NEGATIVE
IMM GRANULOCYTES # BLD AUTO: 0.01 10*3/MM3 (ref 0–0.05)
IMM GRANULOCYTES NFR BLD AUTO: 0.2 % (ref 0–0.5)
KETONES UR QL STRIP: NEGATIVE
LDLC SERPL CALC-MCNC: 56 MG/DL (ref 0–100)
LDLC/HDLC SERPL: 1.64 {RATIO}
LEUKOCYTE ESTERASE UR QL STRIP.AUTO: NEGATIVE
LYMPHOCYTES # BLD AUTO: 1.97 10*3/MM3 (ref 0.7–3.1)
LYMPHOCYTES NFR BLD AUTO: 29.8 % (ref 19.6–45.3)
MCH RBC QN AUTO: 28.4 PG (ref 26.6–33)
MCHC RBC AUTO-ENTMCNC: 33.2 G/DL (ref 31.5–35.7)
MCV RBC AUTO: 85.5 FL (ref 79–97)
MONOCYTES # BLD AUTO: 0.59 10*3/MM3 (ref 0.1–0.9)
MONOCYTES NFR BLD AUTO: 8.9 % (ref 5–12)
NEUTROPHILS NFR BLD AUTO: 3.88 10*3/MM3 (ref 1.7–7)
NEUTROPHILS NFR BLD AUTO: 58.7 % (ref 42.7–76)
NITRITE UR QL STRIP: NEGATIVE
NRBC BLD AUTO-RTO: 0 /100 WBC (ref 0–0.2)
PH UR STRIP.AUTO: 6 [PH] (ref 5–8)
PLATELET # BLD AUTO: 213 10*3/MM3 (ref 140–450)
PMV BLD AUTO: 11 FL (ref 6–12)
POTASSIUM SERPL-SCNC: 4.2 MMOL/L (ref 3.5–5.2)
PROT SERPL-MCNC: 8 G/DL (ref 6–8.5)
PROT UR QL STRIP: NEGATIVE
RBC # BLD AUTO: 5.04 10*6/MM3 (ref 4.14–5.8)
SODIUM SERPL-SCNC: 135 MMOL/L (ref 136–145)
SP GR UR STRIP: 1.02 (ref 1–1.03)
TRIGL SERPL-MCNC: 279 MG/DL (ref 0–150)
TSH SERPL DL<=0.05 MIU/L-ACNC: 1.59 UIU/ML (ref 0.27–4.2)
UROBILINOGEN UR QL STRIP: NORMAL
VLDLC SERPL-MCNC: 44 MG/DL (ref 5–40)
WBC NRBC COR # BLD AUTO: 6.61 10*3/MM3 (ref 3.4–10.8)

## 2024-12-05 PROCEDURE — 83036 HEMOGLOBIN GLYCOSYLATED A1C: CPT | Performed by: PHYSICIAN ASSISTANT

## 2024-12-05 PROCEDURE — 86803 HEPATITIS C AB TEST: CPT | Performed by: PHYSICIAN ASSISTANT

## 2024-12-05 PROCEDURE — 80061 LIPID PANEL: CPT | Performed by: PHYSICIAN ASSISTANT

## 2024-12-05 PROCEDURE — 85025 COMPLETE CBC W/AUTO DIFF WBC: CPT | Performed by: PHYSICIAN ASSISTANT

## 2024-12-05 PROCEDURE — 82043 UR ALBUMIN QUANTITATIVE: CPT | Performed by: PHYSICIAN ASSISTANT

## 2024-12-05 PROCEDURE — 80053 COMPREHEN METABOLIC PANEL: CPT | Performed by: PHYSICIAN ASSISTANT

## 2024-12-05 PROCEDURE — 84443 ASSAY THYROID STIM HORMONE: CPT | Performed by: PHYSICIAN ASSISTANT

## 2024-12-05 RX ORDER — DAPAGLIFLOZIN 10 MG/1
10 TABLET, FILM COATED ORAL DAILY
Qty: 30 TABLET | Refills: 2 | Status: SHIPPED | OUTPATIENT
Start: 2024-12-05

## 2024-12-05 NOTE — PROGRESS NOTES
I have reviewed the notes, assessments, and/or procedures performed by Karishma Paniagua PA-C, I concur with her documentation of Eva Casas.

## 2024-12-05 NOTE — ASSESSMENT & PLAN NOTE
Discussed importance of taking meds as rx, will decrease metformin to BID. Will send Jardiance to start now that insurance active. Staff called pharmacy, [place] for tresiba approved. Will start 10 units at night and titrate q 3 days per instructions. RETURN TO CLINIC with bg log in 2 wks. Pt understands and agrees with plan.

## 2024-12-05 NOTE — ASSESSMENT & PLAN NOTE
Discussed blood pressure elevation at today's visit. Risks of blood pressure elevation include death, heart attack, stroke, kidney disease, blindness. Low salt diet, increase exercise. Discussed importance of medication compliance and not missing doses. Continue current medications at this time. We will monitor at follow up and adjust medications if still elevated. To er if chest pain, palpitations, vision loss, unilateral weakness, altered mental status. Pt understands and agrees with plan.

## 2024-12-05 NOTE — PROGRESS NOTES
Chief Complaint  Diabetes    Subjective          Eva Casas presents to [facility]  History of Present Illness  Pt here for f/u on diabetes  He was not able to get Tresiba due to insurance issue   He has been taking Metformin 1000mg TID   Bg this am 176  Running in 200s  Pt has burning with urination x 2 days ago   Denies frequency, hematuria, incontinence     Past Medical History:   Diagnosis Date    Borderline diabetic     Borderline high cholesterol     Gout     Hypertension         History reviewed. No pertinent surgical history.     Current Outpatient Medications on File Prior to Visit   Medication Sig Dispense Refill    allopurinol (ZYLOPRIM) 300 MG tablet Take 1 tablet by mouth Daily. 90 tablet 1    aspirin 81 MG EC tablet Take 1 tablet by mouth Daily.      atorvastatin (LIPITOR) 10 MG tablet Take 1 tablet by mouth Daily.      cetirizine (zyrTEC) 10 MG tablet Take 1 tablet by mouth Daily.      cholecalciferol (VITAMIN D3) 10 MCG (400 UNIT) tablet Take 1 tablet by mouth Daily.      colchicine 0.6 MG tablet Take 1 tablet by mouth Daily.      folic acid (FOLVITE) 1 MG tablet Take 1 tablet by mouth Daily.      Insulin Pen Needle 29G X 12.7MM misc Use 1 Pen Every Night. 90 each 1    losartan (COZAAR) 100 MG tablet Take 1 tablet by mouth Daily.      metFORMIN (GLUCOPHAGE) 500 MG tablet Take 2 tablets by mouth 2 (Two) Times a Day With Meals for 30 days. 120 tablet 0    mometasone-formoterol (Dulera) 100-5 MCG/ACT inhaler Inhale 1 puff 2 (Two) Times a Day. 13 g 1    triamterene-hydrochlorothiazide (MAXZIDE) 75-50 MG per tablet Take 1 tablet by mouth Daily.      insulin degludec (TRESIBA FLEXTOUCH) 100 UNIT/ML solution pen-injector injection Inject 15 Units under the skin into the appropriate area as directed Every Night. (Patient not taking: Reported on 12/5/2024) 30 mL 1     No current facility-administered medications on file prior to visit.        No Known  "Allergies    Social History     Tobacco Use   Smoking Status Never    Passive exposure: Never   Smokeless Tobacco Never          Objective   Vital Signs:   /94 (BP Location: Left arm, Patient Position: Sitting, Cuff Size: Adult)   Pulse 78   Temp 97.6 °F (36.4 °C) (Temporal)   Resp 18   Ht 190.5 cm (75\")   Wt 111 kg (244 lb 12.8 oz)   SpO2 98%   BMI 30.60 kg/m²     Physical Exam  Vitals reviewed.   Constitutional:       Appearance: Normal appearance.   HENT:      Head: Normocephalic and atraumatic.      Nose: Nose normal.      Mouth/Throat:      Mouth: Mucous membranes are moist.   Eyes:      Extraocular Movements: Extraocular movements intact.      Conjunctiva/sclera: Conjunctivae normal.      Pupils: Pupils are equal, round, and reactive to light.   Cardiovascular:      Rate and Rhythm: Normal rate and regular rhythm.   Pulmonary:      Effort: Pulmonary effort is normal.      Breath sounds: Normal breath sounds.   Abdominal:      General: Abdomen is flat. Bowel sounds are normal.      Palpations: Abdomen is soft.      Tenderness: There is no right CVA tenderness or left CVA tenderness.   Musculoskeletal:         General: Normal range of motion.   Neurological:      General: No focal deficit present.      Mental Status: He is alert and oriented to person, place, and time.   Psychiatric:         Mood and Affect: Mood normal.        Result Review :                  Assessment and Plan    Diagnoses and all orders for this visit:    1. Type 2 diabetes mellitus with hyperglycemia, without long-term current use of insulin (Primary)  Assessment & Plan:  Discussed importance of taking meds as rx, will decrease metformin to BID. Will send Jardiance to start now that insurance active. Staff called pharmacy, [place] for tresiba approved. Will start 10 units at night and titrate q 3 days per instructions. RETURN TO CLINIC with bg log in 2 wks. Pt understands and agrees with plan.    Orders:  -     Urinalysis With " Culture If Indicated -; Future  -     Comprehensive Metabolic Panel  -     CBC & Differential  -     TSH  -     Lipid Panel  -     Hemoglobin A1c  -     Urinalysis With Culture If Indicated - Urine, Clean Catch  -     MicroAlbumin, Urine, Random - Urine, Clean Catch; Future  -     MicroAlbumin, Urine, Random - Urine, Clean Catch    2. Primary hypertension  Assessment & Plan:  Discussed blood pressure elevation at today's visit. Risks of blood pressure elevation include death, heart attack, stroke, kidney disease, blindness. Low salt diet, increase exercise. Discussed importance of medication compliance and not missing doses. Continue current medications at this time. We will monitor at follow up and adjust medications if still elevated. To er if chest pain, palpitations, vision loss, unilateral weakness, altered mental status. Pt understands and agrees with plan.          3. Screening for viral disease  -     Hepatitis C Antibody    Other orders  -     dapagliflozin Propanediol (Farxiga) 10 MG tablet; Take 10 mg by mouth Daily.  Dispense: 30 tablet; Refill: 2        Follow Up   Return in about 2 weeks (around 12/19/2024).  Patient was given instructions and counseling regarding his condition or for health maintenance advice. Please see specific information pulled into the AVS if appropriate.

## 2024-12-19 ENCOUNTER — OFFICE VISIT (OUTPATIENT)
Dept: INTERNAL MEDICINE | Facility: CLINIC | Age: 64
End: 2024-12-19
Payer: COMMERCIAL

## 2024-12-19 VITALS
TEMPERATURE: 98 F | DIASTOLIC BLOOD PRESSURE: 92 MMHG | HEART RATE: 65 BPM | SYSTOLIC BLOOD PRESSURE: 152 MMHG | BODY MASS INDEX: 31.08 KG/M2 | RESPIRATION RATE: 16 BRPM | OXYGEN SATURATION: 98 % | WEIGHT: 250 LBS | HEIGHT: 75 IN

## 2024-12-19 DIAGNOSIS — Z12.11 SCREENING FOR COLON CANCER: ICD-10-CM

## 2024-12-19 DIAGNOSIS — I10 PRIMARY HYPERTENSION: ICD-10-CM

## 2024-12-19 DIAGNOSIS — E11.65 TYPE 2 DIABETES MELLITUS WITH HYPERGLYCEMIA, WITHOUT LONG-TERM CURRENT USE OF INSULIN: Primary | ICD-10-CM

## 2024-12-19 NOTE — ASSESSMENT & PLAN NOTE
Improved with oral medicines. Will cont current medicine and repeat labs in 3 months. Pt understands and agrees with plan.

## 2024-12-19 NOTE — PROGRESS NOTES
I have reviewed the notes, assessments, and/or procedures performed by Karishma Panigaua PA-C, I concur with her documentation of Eva Casas.

## 2024-12-19 NOTE — PROGRESS NOTES
Chief Complaint  Diabetes and Hypertension    Subjective          Eva Casas presents to [facility]  History of Present Illness  Pt here for follow up   [facility] since bg has improved with metformin and Farxiga  Bg today 90  Evening bg 130-140  Denies low bg, dizziness, shakiness  HTN: did not take bp medicine this am  Denies chest pain, palpitations, ha, dizziness    Past Medical History:   Diagnosis Date    Borderline diabetic     Borderline high cholesterol     Gout     Hypertension         History reviewed. No pertinent surgical history.     Current Outpatient Medications on File Prior to Visit   Medication Sig Dispense Refill    allopurinol (ZYLOPRIM) 300 MG tablet Take 1 tablet by mouth Daily. 90 tablet 1    aspirin 81 MG EC tablet Take 1 tablet by mouth Daily.      atorvastatin (LIPITOR) 10 MG tablet Take 1 tablet by mouth Daily.      cetirizine (zyrTEC) 10 MG tablet Take 1 tablet by mouth Daily.      cholecalciferol (VITAMIN D3) 10 MCG (400 UNIT) tablet Take 1 tablet by mouth Daily.      colchicine 0.6 MG tablet Take 1 tablet by mouth Daily.      dapagliflozin Propanediol (Farxiga) 10 MG tablet Take 10 mg by mouth Daily. 30 tablet 2    folic acid (FOLVITE) 1 MG tablet Take 1 tablet by mouth Daily.      Insulin Pen Needle 29G X 12.7MM misc Use 1 Pen Every Night. 90 each 1    losartan (COZAAR) 100 MG tablet Take 1 tablet by mouth Daily.      mometasone-formoterol (Dulera) 100-5 MCG/ACT inhaler Inhale 1 puff 2 (Two) Times a Day. 13 g 1    triamterene-hydrochlorothiazide (MAXZIDE) 75-50 MG per tablet Take 1 tablet by mouth Daily.      [DISCONTINUED] metFORMIN (GLUCOPHAGE) 500 MG tablet Take 2 tablets by mouth 2 (Two) Times a Day With Meals for 30 days. 120 tablet 0    [DISCONTINUED] insulin degludec (TRESIBA FLEXTOUCH) 100 UNIT/ML solution pen-injector injection Inject 15 Units under the skin into the appropriate area as directed  "Every Night. (Patient not taking: Reported on 12/19/2024) 30 mL 1     No current facility-administered medications on file prior to visit.        No Known Allergies    Social History     Tobacco Use   Smoking Status Never    Passive exposure: Never   Smokeless Tobacco Never          Objective   Vital Signs:   /92 (BP Location: Right arm, Patient Position: Sitting, Cuff Size: Large Adult)   Pulse 65   Temp 98 °F (36.7 °C) (Temporal)   Resp 16   Ht 190.5 cm (75\")   Wt 113 kg (250 lb)   SpO2 98%   BMI 31.25 kg/m²     Physical Exam  Vitals reviewed.   Constitutional:       Appearance: Normal appearance.   HENT:      Head: Normocephalic and atraumatic.      Nose: Nose normal.      Mouth/Throat:      Mouth: Mucous membranes are moist.   Eyes:      Extraocular Movements: Extraocular movements intact.      Conjunctiva/sclera: Conjunctivae normal.      Pupils: Pupils are equal, round, and reactive to light.   Cardiovascular:      Rate and Rhythm: Normal rate and regular rhythm.   Pulmonary:      Effort: Pulmonary effort is normal.      Breath sounds: Normal breath sounds.   Abdominal:      General: Abdomen is flat. Bowel sounds are normal.      Palpations: Abdomen is soft.   Musculoskeletal:         General: Normal range of motion.   Neurological:      General: No focal deficit present.      Mental Status: He is alert and oriented to person, place, and time.   Psychiatric:         Mood and Affect: Mood normal.        Result Review :   The following data was reviewed by: Karishma Paniagua PA-C on 12/19/2024:  Common labs          11/25/2024    21:13 12/5/2024    12:07 12/5/2024    12:19   Common Labs   Glucose 555   168    BUN 31   18    Creatinine 1.58   1.46    Sodium 128   135    Potassium 4.3   4.2    Chloride 91   100    Calcium 9.4   10.0    Albumin 4.4   4.0    Total Bilirubin 0.5   0.4    Alkaline Phosphatase 123   87    AST (SGOT) 19   16    ALT (SGPT) 20   24    WBC 8.00   6.61    Hemoglobin 15.9   14.3  "   Hematocrit 48.3   43.1    Platelets 262   213    Total Cholesterol   127    Triglycerides   279    HDL Cholesterol   27    LDL Cholesterol    56    Hemoglobin A1C   14.20    Microalbumin, Urine  6.3                           Assessment and Plan    Diagnoses and all orders for this visit:    1. Type 2 diabetes mellitus with hyperglycemia, without long-term current use of insulin (Primary)  Assessment & Plan:  Improved with oral medicines. Will cont current medicine and repeat labs in 3 months. Pt understands and agrees with plan.      2. Primary hypertension  Assessment & Plan:  Discussed blood pressure elevation at today's visit. Risks of blood pressure elevation include death, heart attack, stroke, kidney disease, blindness. Low salt diet, increase exercise. Discussed importance of medication compliance and not missing doses. Continue current medications at this time. We will monitor at follow up and adjust medications if still elevated. To er if chest pain, palpitations, vision loss, unilateral weakness, altered mental status. Pt understands and agrees with plan.        3. Screening for colon cancer  -     Ambulatory Referral For Screening Colonoscopy    Other orders  -     metFORMIN (GLUCOPHAGE) 1000 MG tablet; Take 1 tablet by mouth 2 (Two) Times a Day With Meals for 30 days.  Dispense: 180 tablet; Refill: 1        Follow Up   Return in about 2 months (around 3/5/2025).  Patient was given instructions and counseling regarding his condition or for health maintenance advice. Please see specific information pulled into the AVS if appropriate.

## 2024-12-30 ENCOUNTER — OFFICE VISIT (OUTPATIENT)
Dept: INTERNAL MEDICINE | Facility: CLINIC | Age: 64
End: 2024-12-30
Payer: COMMERCIAL

## 2024-12-30 VITALS — WEIGHT: 237 LBS | BODY MASS INDEX: 29.47 KG/M2 | HEIGHT: 75 IN | RESPIRATION RATE: 16 BRPM

## 2024-12-30 DIAGNOSIS — K92.1 BLOOD IN STOOL: Primary | ICD-10-CM

## 2024-12-30 DIAGNOSIS — D62 ANEMIA DUE TO BLOOD LOSS, ACUTE: ICD-10-CM

## 2024-12-30 DIAGNOSIS — R06.00 DYSPNEA, UNSPECIFIED TYPE: ICD-10-CM

## 2024-12-30 LAB
ALBUMIN SERPL-MCNC: 4.3 G/DL (ref 3.5–5.2)
ALBUMIN/GLOB SERPL: 1.4 G/DL
ALP SERPL-CCNC: 80 U/L (ref 39–117)
ALT SERPL W P-5'-P-CCNC: 26 U/L (ref 1–41)
ANION GAP SERPL CALCULATED.3IONS-SCNC: 10 MMOL/L (ref 5–15)
AST SERPL-CCNC: 26 U/L (ref 1–40)
BASOPHILS # BLD AUTO: 0.05 10*3/MM3 (ref 0–0.2)
BASOPHILS NFR BLD AUTO: 0.6 % (ref 0–1.5)
BILIRUB SERPL-MCNC: 0.2 MG/DL (ref 0–1.2)
BUN SERPL-MCNC: 10 MG/DL (ref 8–23)
BUN/CREAT SERPL: 7.9 (ref 7–25)
CALCIUM SPEC-SCNC: 9.7 MG/DL (ref 8.6–10.5)
CHLORIDE SERPL-SCNC: 102 MMOL/L (ref 98–107)
CO2 SERPL-SCNC: 27 MMOL/L (ref 22–29)
CREAT SERPL-MCNC: 1.27 MG/DL (ref 0.76–1.27)
DEPRECATED RDW RBC AUTO: 42 FL (ref 37–54)
EGFRCR SERPLBLD CKD-EPI 2021: 63.9 ML/MIN/1.73
EOSINOPHIL # BLD AUTO: 0.11 10*3/MM3 (ref 0–0.4)
EOSINOPHIL NFR BLD AUTO: 1.3 % (ref 0.3–6.2)
ERYTHROCYTE [DISTWIDTH] IN BLOOD BY AUTOMATED COUNT: 13.5 % (ref 12.3–15.4)
FOLATE SERPL-MCNC: 7.06 NG/ML (ref 4.78–24.2)
GLOBULIN UR ELPH-MCNC: 3 GM/DL
GLUCOSE SERPL-MCNC: 120 MG/DL (ref 65–99)
HCT VFR BLD AUTO: 26.7 % (ref 37.5–51)
HGB BLD-MCNC: 8.6 G/DL (ref 13–17.7)
IMM GRANULOCYTES # BLD AUTO: 0.04 10*3/MM3 (ref 0–0.05)
IMM GRANULOCYTES NFR BLD AUTO: 0.5 % (ref 0–0.5)
IRON 24H UR-MRATE: 54 MCG/DL (ref 59–158)
IRON SATN MFR SERPL: 12 % (ref 20–50)
LYMPHOCYTES # BLD AUTO: 2.34 10*3/MM3 (ref 0.7–3.1)
LYMPHOCYTES NFR BLD AUTO: 28.7 % (ref 19.6–45.3)
MAGNESIUM SERPL-MCNC: 1.6 MG/DL (ref 1.6–2.4)
MCH RBC QN AUTO: 27.9 PG (ref 26.6–33)
MCHC RBC AUTO-ENTMCNC: 32.2 G/DL (ref 31.5–35.7)
MCV RBC AUTO: 86.7 FL (ref 79–97)
MONOCYTES # BLD AUTO: 0.64 10*3/MM3 (ref 0.1–0.9)
MONOCYTES NFR BLD AUTO: 7.8 % (ref 5–12)
NEUTROPHILS NFR BLD AUTO: 4.98 10*3/MM3 (ref 1.7–7)
NEUTROPHILS NFR BLD AUTO: 61.1 % (ref 42.7–76)
NRBC BLD AUTO-RTO: 0 /100 WBC (ref 0–0.2)
PLATELET # BLD AUTO: 335 10*3/MM3 (ref 140–450)
PMV BLD AUTO: 10.5 FL (ref 6–12)
POTASSIUM SERPL-SCNC: 4.1 MMOL/L (ref 3.5–5.2)
PROT SERPL-MCNC: 7.3 G/DL (ref 6–8.5)
RBC # BLD AUTO: 3.08 10*6/MM3 (ref 4.14–5.8)
SODIUM SERPL-SCNC: 139 MMOL/L (ref 136–145)
TIBC SERPL-MCNC: 438 MCG/DL (ref 298–536)
TRANSFERRIN SERPL-MCNC: 294 MG/DL (ref 200–360)
VIT B12 BLD-MCNC: 351 PG/ML (ref 211–946)
WBC NRBC COR # BLD AUTO: 8.16 10*3/MM3 (ref 3.4–10.8)

## 2024-12-30 PROCEDURE — 82746 ASSAY OF FOLIC ACID SERUM: CPT | Performed by: PHYSICIAN ASSISTANT

## 2024-12-30 PROCEDURE — 85025 COMPLETE CBC W/AUTO DIFF WBC: CPT | Performed by: PHYSICIAN ASSISTANT

## 2024-12-30 PROCEDURE — 83735 ASSAY OF MAGNESIUM: CPT | Performed by: PHYSICIAN ASSISTANT

## 2024-12-30 PROCEDURE — 80053 COMPREHEN METABOLIC PANEL: CPT | Performed by: PHYSICIAN ASSISTANT

## 2024-12-30 PROCEDURE — 83540 ASSAY OF IRON: CPT | Performed by: PHYSICIAN ASSISTANT

## 2024-12-30 PROCEDURE — 84466 ASSAY OF TRANSFERRIN: CPT | Performed by: PHYSICIAN ASSISTANT

## 2024-12-30 PROCEDURE — 82607 VITAMIN B-12: CPT | Performed by: PHYSICIAN ASSISTANT

## 2024-12-30 NOTE — PROGRESS NOTES
"Chief Complaint  burning in chest with exertion, Black or Bloody Stool, and Vomiting (Emesis x1 last night)    Subjective          Eva Casas presents to [facility]  Hematochezia- symptoms started about a week ago.  He had associated diarrhea/loose stools during that time as well.  Patient did take an immodium about 3 days ago which improved the diarrhea as well as the blood in the stool.  He states that it was mostly a bright red blood but noticed some dark red as well.  He denies any abdominal pain.  He is also complaining of a burning sensation in his sterum and dyspnea on exertion.  Patient states that he had an episode of vomiting last night and since then he has not had the burning sensation or dyspnea like he had before. This morning he had a normal formed stool without any blood.       fasting this morning    Objective   Vital Signs:   Resp 16   Ht 190.5 cm (75\")   Wt 108 kg (237 lb)   BMI 29.62 kg/m²     Physical Exam  Vitals reviewed.   Constitutional:       Appearance: Normal appearance. He is well-developed.   HENT:      Head: Normocephalic and atraumatic.   Eyes:      Conjunctiva/sclera: Conjunctivae normal.      Pupils: Pupils are equal, round, and reactive to light.   Cardiovascular:      Rate and Rhythm: Normal rate and regular rhythm.      Heart sounds: No murmur heard.     No friction rub. No gallop.   Pulmonary:      Effort: Pulmonary effort is normal.      Breath sounds: Normal breath sounds. No wheezing or rhonchi.   Abdominal:      General: Bowel sounds are normal.      Palpations: Abdomen is soft.      Tenderness: There is no abdominal tenderness. There is no guarding.      Hernia: No hernia is present.   Skin:     General: Skin is warm and dry.   Neurological:      Mental Status: He is alert and oriented to person, place, and time.      Cranial Nerves: No cranial nerve deficit.   Psychiatric:         Mood and Affect: Mood and " affect normal.         Behavior: Behavior normal.         Thought Content: Thought content normal.         Judgment: Judgment normal.        Result Review :            ECG 12 Lead    Date/Time: 12/30/2024 6:28 PM  Performed by: Ani Coyle PA-C    Authorized by: Ani Coyle PA-C  Comparison: not compared with previous ECG   Rhythm: sinus rhythm  Rate: normal  Conduction: conduction normal  ST Segments: ST segments normal  QRS axis: normal    Clinical impression: normal ECG  Comments: Borderline T wave abnormalities in V2            Assessment and Plan    Diagnoses and all orders for this visit:    1. Blood in stool (Primary)  Assessment & Plan:  Will send patient to GI for possible upper and lower scope.  Symptoms could be secondary to GI bug causing diarrhea and GERD like symptoms.  EKG in office without significant concern.  Dyspnea could be due to possible dehydration/anemia?  Will check blood work today to evaluate electrolytes and hemoglobin status.  Would add probiotics to diet and push fluids at this time.  Continue to monitor symptoms.  If his symptoms return such as significant blood in stool, dyspnea or chest pain would recommend immediate evaluation in the ER.  Call for any questions or concerns.    Orders:  -     CBC w AUTO Differential  -     Comprehensive metabolic panel  -     Magnesium  -     Iron and TIBC  -     Vitamin B12  -     Folate  -     ECG 12 Lead  -     Ambulatory Referral to Gastroenterology    2. Dyspnea, unspecified type  -     CBC w AUTO Differential  -     Comprehensive metabolic panel  -     Iron and TIBC              Follow Up   No follow-ups on file.  Patient was given instructions and counseling regarding his condition or for health maintenance advice. Please see specific information pulled into the AVS if appropriate.

## 2024-12-30 NOTE — ASSESSMENT & PLAN NOTE
Will send patient to GI for possible upper and lower scope.  Symptoms could be secondary to GI bug causing diarrhea and GERD like symptoms.  EKG in office without significant concern.  Dyspnea could be due to possible dehydration/anemia?  Will check blood work today to evaluate electrolytes and hemoglobin status.  Would add probiotics to diet and push fluids at this time.  Continue to monitor symptoms.  If his symptoms return such as significant blood in stool, dyspnea or chest pain would recommend immediate evaluation in the ER.  Call for any questions or concerns.

## 2025-01-02 ENCOUNTER — TELEPHONE (OUTPATIENT)
Dept: INTERNAL MEDICINE | Facility: CLINIC | Age: 65
End: 2025-01-02
Payer: COMMERCIAL

## 2025-01-02 NOTE — TELEPHONE ENCOUNTER
Per fax from [employer]: CT denied per Medical Director. Please call [phone] option 1 for P2P within 5 business days. Reference #: [id number].

## 2025-01-07 DIAGNOSIS — K92.1 BLOOD IN STOOL: Primary | ICD-10-CM

## 2025-01-07 DIAGNOSIS — D62 ANEMIA DUE TO BLOOD LOSS, ACUTE: Primary | ICD-10-CM

## 2025-01-07 DIAGNOSIS — D62 ANEMIA DUE TO BLOOD LOSS, ACUTE: ICD-10-CM

## 2025-01-07 NOTE — TELEPHONE ENCOUNTER
P2P completed, CT approved but patient needs to get blood work prior to scheduling CT.  If he has any return of symptoms patient needs to go to the ER.

## 2025-01-08 ENCOUNTER — CLINICAL SUPPORT (OUTPATIENT)
Dept: INTERNAL MEDICINE | Facility: CLINIC | Age: 65
End: 2025-01-08
Payer: COMMERCIAL

## 2025-01-08 ENCOUNTER — TELEPHONE (OUTPATIENT)
Dept: INTERNAL MEDICINE | Facility: CLINIC | Age: 65
End: 2025-01-08

## 2025-01-08 DIAGNOSIS — E11.65 TYPE 2 DIABETES MELLITUS WITH HYPERGLYCEMIA, WITHOUT LONG-TERM CURRENT USE OF INSULIN: ICD-10-CM

## 2025-01-08 DIAGNOSIS — D62 ANEMIA DUE TO BLOOD LOSS, ACUTE: Primary | ICD-10-CM

## 2025-01-08 LAB
ALBUMIN SERPL-MCNC: 3.8 G/DL (ref 3.5–5.2)
ALBUMIN/GLOB SERPL: 1.2 G/DL
ALP SERPL-CCNC: 86 U/L (ref 39–117)
ALT SERPL W P-5'-P-CCNC: 23 U/L (ref 1–41)
ANION GAP SERPL CALCULATED.3IONS-SCNC: 11.6 MMOL/L (ref 5–15)
AST SERPL-CCNC: 24 U/L (ref 1–40)
BASOPHILS # BLD AUTO: 0.03 10*3/MM3 (ref 0–0.2)
BASOPHILS NFR BLD AUTO: 0.6 % (ref 0–1.5)
BILIRUB SERPL-MCNC: 0.2 MG/DL (ref 0–1.2)
BUN SERPL-MCNC: 10 MG/DL (ref 8–23)
BUN/CREAT SERPL: 7.8 (ref 7–25)
CALCIUM SPEC-SCNC: 9 MG/DL (ref 8.6–10.5)
CHLORIDE SERPL-SCNC: 105 MMOL/L (ref 98–107)
CO2 SERPL-SCNC: 23.4 MMOL/L (ref 22–29)
CREAT SERPL-MCNC: 1.29 MG/DL (ref 0.76–1.27)
DEPRECATED RDW RBC AUTO: 44.4 FL (ref 37–54)
EGFRCR SERPLBLD CKD-EPI 2021: 62.7 ML/MIN/1.73
EOSINOPHIL # BLD AUTO: 0.11 10*3/MM3 (ref 0–0.4)
EOSINOPHIL NFR BLD AUTO: 2.4 % (ref 0.3–6.2)
ERYTHROCYTE [DISTWIDTH] IN BLOOD BY AUTOMATED COUNT: 13.7 % (ref 12.3–15.4)
EXPIRATION DATE: ABNORMAL
GLOBULIN UR ELPH-MCNC: 3.2 GM/DL
GLUCOSE BLDC GLUCOMTR-MCNC: 112 MG/DL (ref 70–130)
GLUCOSE SERPL-MCNC: 98 MG/DL (ref 65–99)
HCT VFR BLD AUTO: 28.6 % (ref 37.5–51)
HGB BLD-MCNC: 8.6 G/DL (ref 13–17.7)
HGB BLDA-MCNC: 10.5 G/DL (ref 12–17)
IMM GRANULOCYTES # BLD AUTO: 0.01 10*3/MM3 (ref 0–0.05)
IMM GRANULOCYTES NFR BLD AUTO: 0.2 % (ref 0–0.5)
LYMPHOCYTES # BLD AUTO: 1.18 10*3/MM3 (ref 0.7–3.1)
LYMPHOCYTES NFR BLD AUTO: 25.2 % (ref 19.6–45.3)
Lab: ABNORMAL
MCH RBC QN AUTO: 26.5 PG (ref 26.6–33)
MCHC RBC AUTO-ENTMCNC: 30.1 G/DL (ref 31.5–35.7)
MCV RBC AUTO: 88.3 FL (ref 79–97)
MONOCYTES # BLD AUTO: 0.58 10*3/MM3 (ref 0.1–0.9)
MONOCYTES NFR BLD AUTO: 12.4 % (ref 5–12)
NEUTROPHILS NFR BLD AUTO: 2.77 10*3/MM3 (ref 1.7–7)
NEUTROPHILS NFR BLD AUTO: 59.2 % (ref 42.7–76)
NRBC BLD AUTO-RTO: 0 /100 WBC (ref 0–0.2)
PLATELET # BLD AUTO: 346 10*3/MM3 (ref 140–450)
PMV BLD AUTO: 10.2 FL (ref 6–12)
POTASSIUM SERPL-SCNC: 4 MMOL/L (ref 3.5–5.2)
PROT SERPL-MCNC: 7 G/DL (ref 6–8.5)
RBC # BLD AUTO: 3.24 10*6/MM3 (ref 4.14–5.8)
SODIUM SERPL-SCNC: 140 MMOL/L (ref 136–145)
WBC NRBC COR # BLD AUTO: 4.68 10*3/MM3 (ref 3.4–10.8)

## 2025-01-08 PROCEDURE — 82948 REAGENT STRIP/BLOOD GLUCOSE: CPT | Performed by: STUDENT IN AN ORGANIZED HEALTH CARE EDUCATION/TRAINING PROGRAM

## 2025-01-08 PROCEDURE — 85025 COMPLETE CBC W/AUTO DIFF WBC: CPT | Performed by: PHYSICIAN ASSISTANT

## 2025-01-08 PROCEDURE — 85018 HEMOGLOBIN: CPT | Performed by: STUDENT IN AN ORGANIZED HEALTH CARE EDUCATION/TRAINING PROGRAM

## 2025-01-08 PROCEDURE — 36416 COLLJ CAPILLARY BLOOD SPEC: CPT | Performed by: STUDENT IN AN ORGANIZED HEALTH CARE EDUCATION/TRAINING PROGRAM

## 2025-01-08 PROCEDURE — 80053 COMPREHEN METABOLIC PANEL: CPT | Performed by: PHYSICIAN ASSISTANT

## 2025-01-08 NOTE — PROGRESS NOTES
Venipuncture Blood Specimen Collection  Venipuncture performed in RAC by Katherine Ozuna RN with good hemostasis. Patient tolerated the procedure well without complications.   01/08/25   Katherine Ozuna RN      Capillary Blood Specimen Collection  Capillary blood collection performed in fingerstick by Katherine Ozuna RN. Patient tolerated the procedure well without complications.Blood glucose reported at 112.  Patient's Hemoglobin checked in office today was 10.5.  Provider notified.   01/08/25   Katherine Ozuna RN

## 2025-01-08 NOTE — TELEPHONE ENCOUNTER
Patient seen in office today for Nurse Visit; labs collected as well as POCT Hemoglobin and Blood Glucose.  Patient's Hgb resulted as 10.5.  This is up from last check on 12/30/2024 of 8.6.  Provider notified.    Katherine Ozuna RN BSN  [facility], [facility]

## 2025-01-09 ENCOUNTER — HOSPITAL ENCOUNTER (OUTPATIENT)
Dept: CT IMAGING | Facility: HOSPITAL | Age: 65
Discharge: HOME | End: 2025-01-09
Admitting: PHYSICIAN ASSISTANT
Payer: COMMERCIAL

## 2025-01-09 DIAGNOSIS — K92.1 BLOOD IN STOOL: ICD-10-CM

## 2025-01-09 DIAGNOSIS — D62 ANEMIA DUE TO BLOOD LOSS, ACUTE: ICD-10-CM

## 2025-01-09 PROCEDURE — 74177 CT ABD & PELVIS W/CONTRAST: CPT

## 2025-01-09 PROCEDURE — 25510000001 IOPAMIDOL PER 1 ML: Performed by: PHYSICIAN ASSISTANT

## 2025-01-09 RX ORDER — IOPAMIDOL 755 MG/ML
100 INJECTION, SOLUTION INTRAVASCULAR
Status: COMPLETED | OUTPATIENT
Start: 2025-01-09 | End: 2025-01-09

## 2025-01-09 RX ADMIN — IOPAMIDOL 90 ML: 755 INJECTION, SOLUTION INTRAVENOUS at 16:19

## 2025-01-09 NOTE — TELEPHONE ENCOUNTER
Hemoglobin of 10.5 on poct testing and 8.6 on venous draw which is stable from last check. Pt has ct scan scheduled for today and GI referral was placed urgently at his last visit. Awaiting ct results.

## 2025-01-21 ENCOUNTER — APPOINTMENT (OUTPATIENT)
Dept: GENERAL RADIOLOGY | Facility: HOSPITAL | Age: 65
End: 2025-01-21
Payer: COMMERCIAL

## 2025-01-21 ENCOUNTER — TELEPHONE (OUTPATIENT)
Dept: GASTROENTEROLOGY | Facility: CLINIC | Age: 65
End: 2025-01-21
Payer: COMMERCIAL

## 2025-01-21 ENCOUNTER — HOSPITAL ENCOUNTER (EMERGENCY)
Facility: HOSPITAL | Age: 65
Discharge: HOME | End: 2025-01-21
Attending: EMERGENCY MEDICINE | Admitting: EMERGENCY MEDICINE
Payer: COMMERCIAL

## 2025-01-21 VITALS
HEART RATE: 50 BPM | TEMPERATURE: 98.2 F | RESPIRATION RATE: 18 BRPM | OXYGEN SATURATION: 100 % | DIASTOLIC BLOOD PRESSURE: 75 MMHG | SYSTOLIC BLOOD PRESSURE: 125 MMHG | BODY MASS INDEX: 28.81 KG/M2 | HEIGHT: 75 IN | WEIGHT: 231.7 LBS

## 2025-01-21 DIAGNOSIS — J34.89 SINUS PRESSURE: Primary | ICD-10-CM

## 2025-01-21 DIAGNOSIS — R05.2 SUBACUTE COUGH: ICD-10-CM

## 2025-01-21 LAB
FLUAV SUBTYP SPEC NAA+PROBE: NOT DETECTED
FLUBV RNA ISLT QL NAA+PROBE: NOT DETECTED
RSV RNA NPH QL NAA+NON-PROBE: NOT DETECTED
SARS-COV-2 RNA RESP QL NAA+PROBE: NOT DETECTED

## 2025-01-21 PROCEDURE — 71045 X-RAY EXAM CHEST 1 VIEW: CPT

## 2025-01-21 PROCEDURE — 87637 SARSCOV2&INF A&B&RSV AMP PRB: CPT | Performed by: EMERGENCY MEDICINE

## 2025-01-21 PROCEDURE — 99283 EMERGENCY DEPT VISIT LOW MDM: CPT

## 2025-01-21 RX ORDER — CETIRIZINE HYDROCHLORIDE 10 MG/1
10 TABLET ORAL DAILY
Qty: 30 TABLET | Refills: 0 | Status: SHIPPED | OUTPATIENT
Start: 2025-01-21 | End: 2025-02-20

## 2025-01-21 RX ORDER — CETIRIZINE HYDROCHLORIDE 10 MG/1
10 TABLET ORAL ONCE
Status: COMPLETED | OUTPATIENT
Start: 2025-01-21 | End: 2025-01-21

## 2025-01-21 RX ADMIN — CETIRIZINE HYDROCHLORIDE 10 MG: 10 TABLET, FILM COATED ORAL at 16:24

## 2025-01-21 NOTE — DISCHARGE INSTRUCTIONS
Home.  Take medication as prescribed daily unless told otherwise by your GI doctor.  Increase your fluid intake.    Keep all your appointments as scheduled for follow-up.  You may also call your primary care provider to schedule an appointment for follow-up as needed.    If symptoms worsen, change in presentation, or you develop new symptoms please seek medical attention or return to the ED for further evaluation.

## 2025-01-21 NOTE — ED PROVIDER NOTES
Time: 1:46 PM EST  Date of encounter:  1/21/2025  Independent Historian/Clinical History and Information was obtained by:   Patient and Family    History is limited by: N/A    Chief Complaint   Patient presents with    Cough     Cough and congestion with chills since dec 23, not getting better, has been to pcp         History of Present Illness:  Patient is a 64 y.o. year old male who presents to the emergency department for evaluation of cough and congestion.  Patient states he has had ongoing viral upper respiratory illness since end of December.  He has been seen by his primary care doctor and had various treatments with little to no relief.  He states he can feel better 1 day and the next day he feels bad again.  Patient complains of an unrelenting cough and occasionally gets short of breath when walking upstairs but recovers with minimal effort.  Denies fever but states he had chills last night that lasted several hours.  Denies nausea or vomiting.    Patient Care Team  Primary Care Provider: Amy Campbell MD    Past Medical History:     No Known Allergies  Past Medical History:   Diagnosis Date    Borderline diabetic     Borderline high cholesterol     Gout     Hypertension      No past surgical history on file.  No family history on file.    Home Medications:  Prior to Admission medications    Medication Sig Start Date End Date Taking? Authorizing Provider   allopurinol (ZYLOPRIM) 300 MG tablet Take 1 tablet by mouth Daily. 9/6/23   Amy Campbell MD   aspirin 81 MG EC tablet Take 1 tablet by mouth Daily.    Provider, MD Evgeny   atorvastatin (LIPITOR) 10 MG tablet Take 1 tablet by mouth Daily.    Provider, MD Evgeny   brompheniramine-pseudoephedrine-DM 30-2-10 MG/5ML syrup Take 10 mL by mouth 4 (Four) Times a Day As Needed for Congestion or Cough. 1/11/25   Daisy Johnson PA-C   cetirizine (zyrTEC) 10 MG tablet Take 1 tablet by mouth Daily.    Provider, MD Evgeny   cholecalciferol  "(VITAMIN D3) 10 MCG (400 UNIT) tablet Take 1 tablet by mouth Daily.    Evgeny Sarmiento MD   colchicine 0.6 MG tablet Take 1 tablet by mouth Daily.    Evgeny Sarmiento MD   dapagliflozin Propanediol (Farxiga) 10 MG tablet Take 10 mg by mouth Daily. 12/5/24   Karishma Paniagua PA-C   folic acid (FOLVITE) 1 MG tablet Take 1 tablet by mouth Daily.    Evgeny Sarmiento MD   Insulin Pen Needle 29G X 12.7MM misc Use 1 Pen Every Night. 11/27/24   Karishma Paniagua PA-C   losartan (COZAAR) 100 MG tablet Take 1 tablet by mouth Daily.    Evgeny Sarmiento MD   metFORMIN (GLUCOPHAGE) 1000 MG tablet Take 1 tablet by mouth 2 (Two) Times a Day With Meals for 30 days. 12/19/24 1/18/25  Karishma Paniagua PA-C   mometasone-formoterol (Dulera) 100-5 MCG/ACT inhaler Inhale 1 puff 2 (Two) Times a Day.  Patient taking differently: Inhale 1 puff As Needed. 9/6/23   Amy Campbell MD   triamterene-hydrochlorothiazide (MAXZIDE) 75-50 MG per tablet Take 1 tablet by mouth Daily.    ProviderEvgeny MD        Social History:   Social History     Tobacco Use    Smoking status: Never     Passive exposure: Never    Smokeless tobacco: Never   Vaping Use    Vaping status: Never Used   Substance Use Topics    Alcohol use: Never    Drug use: Never         Review of Systems:  Review of Systems   Constitutional: Negative.  Positive for chills.   HENT: Negative.  Positive for congestion and sinus pressure.    Eyes: Negative.    Respiratory: Negative.  Positive for cough.    Cardiovascular: Negative.    Gastrointestinal: Negative.    Endocrine: Negative.    Genitourinary: Negative.    Musculoskeletal: Negative.    Skin: Negative.    Allergic/Immunologic: Negative.    Neurological: Negative.    Hematological: Negative.    Psychiatric/Behavioral: Negative.          Physical Exam:  /75 (BP Location: Right arm, Patient Position: Sitting)   Pulse 50   Temp 98.2 °F (36.8 °C) (Oral)   Resp 18   Ht 190.5 cm (75\")   Wt 105 kg " (231 lb 11.3 oz)   SpO2 100%   BMI 28.96 kg/m²         Physical Exam  Vitals and nursing note reviewed.   Constitutional:       General: He is not in acute distress.     Appearance: Normal appearance. He is not toxic-appearing.   HENT:      Head: Normocephalic and atraumatic.      Jaw: There is normal jaw occlusion.      Right Ear: Tympanic membrane, ear canal and external ear normal.      Left Ear: Tympanic membrane, ear canal and external ear normal.      Nose: Congestion present.      Right Sinus: Maxillary sinus tenderness present.      Left Sinus: Maxillary sinus tenderness present.      Mouth/Throat:      Mouth: Mucous membranes are moist.      Pharynx: Posterior oropharyngeal erythema present.   Eyes:      General: Lids are normal.      Extraocular Movements: Extraocular movements intact.      Conjunctiva/sclera: Conjunctivae normal.      Pupils: Pupils are equal, round, and reactive to light.   Cardiovascular:      Rate and Rhythm: Normal rate and regular rhythm.      Pulses: Normal pulses.      Heart sounds: Normal heart sounds.   Pulmonary:      Effort: Pulmonary effort is normal. No respiratory distress.      Breath sounds: Normal breath sounds. No stridor. No wheezing, rhonchi or rales.   Abdominal:      General: Abdomen is flat. Bowel sounds are normal. There is no distension.      Palpations: Abdomen is soft.      Tenderness: There is no abdominal tenderness. There is no right CVA tenderness, left CVA tenderness, guarding or rebound.   Musculoskeletal:         General: Normal range of motion.      Cervical back: Normal range of motion and neck supple.      Right lower leg: No edema.      Left lower leg: No edema.   Skin:     General: Skin is warm and dry.   Neurological:      Mental Status: He is alert and oriented to person, place, and time. Mental status is at baseline.   Psychiatric:         Mood and Affect: Mood normal.                       Medical Decision Making:      Comorbidities that affect  care:    Hypertension, gout    External Notes reviewed:    Previous Clinic Note: I reviewed patient's last office note with urgent care from 1/11/2025.      The following orders were placed and all results were independently analyzed by me:  Orders Placed This Encounter   Procedures    COVID-19, FLU A/B, RSV PCR 1 HR TAT - Swab, Nasopharynx    XR Chest 1 View       Medications Given in the Emergency Department:  Medications   cetirizine (zyrTEC) tablet 10 mg (10 mg Oral Given 1/21/25 1624)        ED Course:    The patient was initially evaluated in the triage area where orders were placed. The patient was later dispositioned by KATIE Alexander.      The patient was advised to stay for completion of workup which includes but is not limited to communication of labs and radiological results, reassessment and plan. The patient was advised that leaving prior to disposition by a provider could result in critical findings that are not communicated to the patient.          Labs:    Lab Results (last 24 hours)       Procedure Component Value Units Date/Time    COVID-19, FLU A/B, RSV PCR 1 HR TAT - Swab, Nasopharynx [[id number]]  (Normal) Collected: 01/21/25 1442    Specimen: Swab from Nasopharynx Updated: 01/21/25 1544     COVID19 Not Detected     Influenza A PCR Not Detected     Influenza B PCR Not Detected     RSV, PCR Not Detected    Narrative:      Fact sheet for providers: https://www.fda.gov/media/560000/download    Fact sheet for patients: https://www.fda.gov/media/993064/download    Test performed by PCR.             Imaging:    XR Chest 1 View    Result Date: 1/21/2025  XR CHEST 1 VW Date of Exam: 1/21/2025 2:11 PM EST Indication: Cough, persistent Cough cough Comparison: None available. Findings: No focal pulmonary consolidations. No pneumothorax or pleural fluid identified. Pulmonary vascularity appears within normal limits. Heart size and mediastinal contour are within normal limits. There is degenerative  change in the spine with multilevel confluent osteophytes.     Impression: No acute cardiopulmonary findings. Electronically Signed: Joselito Brownlee MD  1/21/2025 2:29 PM EST  Workstation ID: [id number]       Differential Diagnosis and Discussion:      Cough: Differential diagnosis includes but is not limited to pneumonia, acute bronchitis, upper respiratory infection, ACE inhibitor use, allergic reaction, epiglottitis, seasonal allergies, chemical irritants, exercise-induced asthma, viral syndrome.    PROCEDURES:    Labs were collected in the emergency department and all labs were reviewed and interpreted by me.  X-ray were performed in the emergency department and all X-ray impressions were independently interpreted by me.    No orders to display        Procedures    MDM  Number of Diagnoses or Management Options  Sinus pressure  Subacute cough  Diagnosis management comments: The patient presents to the ED with a cough. The patient is resting comfortably and feels better, is alert and in no distress.  On re-examination the patient does not appear toxic and has no meningeal signs (including a negative Kernig and Brudzinski sign), and there's no intractable vomiting, respiratory distress and no apparent pain. Based on the history, exam, diagnostic testing and reassessment, the patient has no signs of meningitis, significant pneumonia, pyelonephritis, sepsis or other acute serious bacterial infections, or other significant pathology to warrant further testing, continued ED treatment, admission or specialist evaluation. The patient's vital signs have been stable. The patient's condition is stable and is appropriate for discharge. The patient´s symptoms are consistent with a viral upper respiratory infection and antibiotics are not indicated. The patient was counseled to return to the ED for re-evaluation for worsening cough, shortness of breath, uncontrollable headache, uncontrollable fever, altered mental status, or any  symptoms which cause them concern. The patient will pursue further outpatient evaluation with the primary care physician or other designated or consultant physician as indicated in the discharge instructions.  Viral swabs negative.  Imaging negative for acute findings.               Amount and/or Complexity of Data Reviewed  Clinical lab tests: ordered and reviewed  Tests in the radiology section of CPT®: reviewed and ordered         Patient Care Considerations:    SEPSIS was considered but is NOT present in the emergency department as SIRS criteria is not present. ANTIBIOTICS: I considered prescribing antibiotics as an outpatient however no bacterial focus of infection was found.      Consultants/Shared Management Plan:    None    Social Determinants of Health:    Patient is independent, reliable, and has access to care.       Disposition and Care Coordination:    Discharged: The patient is suitable and stable for discharge with no need for consideration of admission.    I have explained the patient´s condition, diagnoses and treatment plan based on the information available to me at this time. I have answered questions and addressed any concerns. The patient has a good  understanding of the patient´s diagnosis, condition, and treatment plan as can be expected at this point. The vital signs have been stable. The patient´s condition is stable and appropriate for discharge from the emergency department.      The patient will pursue further outpatient evaluation with the primary care physician or other designated or consulting physician as outlined in the discharge instructions. They are agreeable to this plan of care and follow-up instructions have been explained in detail. The patient has received these instructions in written format and has expressed an understanding of the discharge instructions. The patient is aware that any significant change in condition or worsening of symptoms should prompt an immediate  return to this or the closest emergency department or call to 911.  I have explained discharge medications and the need for follow up with the patient/caretakers. This was also printed in the discharge instructions. Patient was discharged with the following medications and follow up:      Medication List        Changed      Dulera 100-5 MCG/ACT inhaler  Generic drug: mometasone-formoterol  Inhale 1 puff 2 (Two) Times a Day.  What changed:   when to take this  reasons to take this               Where to Get Your Medications        These medications were sent to [facility]/pharmacy #[id number] - [place], [place] - [address] - [phone] [facility] [phone]   [address] [place] [address]      Hours: 24-hours Phone: [phone]   cetirizine 10 MG tablet      Amy Campbell MD  [address] [address] [phone]    Call   As needed       Final diagnoses:   Sinus pressure   Subacute cough        ED Disposition       ED Disposition   Discharge    Condition   Stable    Comment   --               This medical record created using voice recognition software.             Genet Benitez, APRN  01/21/25 1730

## 2025-01-21 NOTE — TELEPHONE ENCOUNTER
Spoke with patient and he has scheduled an appointment with Katherine Ross on 1/22/25@10:00 from a referral we received from Ani Coyle for  blood in stool/Anemia due to blood loss, acute

## 2025-01-23 ENCOUNTER — OFFICE VISIT (OUTPATIENT)
Dept: GASTROENTEROLOGY | Facility: CLINIC | Age: 65
End: 2025-01-23
Payer: COMMERCIAL

## 2025-01-23 VITALS
BODY MASS INDEX: 28.12 KG/M2 | DIASTOLIC BLOOD PRESSURE: 95 MMHG | WEIGHT: 226.2 LBS | HEART RATE: 116 BPM | HEIGHT: 75 IN | SYSTOLIC BLOOD PRESSURE: 132 MMHG

## 2025-01-23 DIAGNOSIS — D50.9 IRON DEFICIENCY ANEMIA, UNSPECIFIED IRON DEFICIENCY ANEMIA TYPE: Primary | ICD-10-CM

## 2025-01-23 DIAGNOSIS — R63.4 WEIGHT LOSS, ABNORMAL: ICD-10-CM

## 2025-01-23 RX ORDER — SODIUM, POTASSIUM,MAG SULFATES 17.5-3.13G
2 SOLUTION, RECONSTITUTED, ORAL ORAL TAKE AS DIRECTED
Qty: 177 ML | Refills: 0 | Status: SHIPPED | OUTPATIENT
Start: 2025-01-23

## 2025-01-23 NOTE — PROGRESS NOTES
Chief Complaint     Black or Bloody Stool and Anemia    History of Present Illness     Eva Casas is a 64 y.o. male who presents to [facility] on referral from Ani Coyle PA-C for a gastroenterology evaluation of anemia and blood in stool.      He reports noticing blood in stool 12/22.  States that he noticed BRB with bowel movements for several days.  He was having diarrhea.  This lasted for a few days and then resolved.      Denies hx of GI bleed.  Denies abdominal pain.      Reports unexplained weight loss of 30+ lbs over the past few months.  He was dx with diabetes and started on metformin in December.       History      Past Medical History:   Diagnosis Date    Borderline diabetic     Borderline high cholesterol     Gout     Hypertension        History reviewed. No pertinent surgical history.    History reviewed. No pertinent family history.     Current Medications        Current Outpatient Medications:     allopurinol (ZYLOPRIM) 300 MG tablet, Take 1 tablet by mouth Daily., Disp: 90 tablet, Rfl: 1    atorvastatin (LIPITOR) 10 MG tablet, Take 1 tablet by mouth Daily., Disp: , Rfl:     cetirizine (zyrTEC) 10 MG tablet, Take 1 tablet by mouth Daily for 30 days., Disp: 30 tablet, Rfl: 0    cholecalciferol (VITAMIN D3) 10 MCG (400 UNIT) tablet, Take 1 tablet by mouth Daily., Disp: , Rfl:     colchicine 0.6 MG tablet, Take 1 tablet by mouth Daily., Disp: , Rfl:     dapagliflozin Propanediol (Farxiga) 10 MG tablet, Take 10 mg by mouth Daily., Disp: 30 tablet, Rfl: 2    Insulin Pen Needle 29G X 12.7MM misc, Use 1 Pen Every Night., Disp: 90 each, Rfl: 1    losartan (COZAAR) 100 MG tablet, Take 1 tablet by mouth Daily., Disp: , Rfl:     metFORMIN (GLUCOPHAGE) 1000 MG tablet, Take 1 tablet by mouth 2 (Two) Times a Day With Meals for 30 days., Disp: 180 tablet, Rfl: 1    mometasone-formoterol (Dulera) 100-5 MCG/ACT inhaler, Inhale 1 puff 2 (Two) Times a Day. (Patient taking  "differently: Inhale 1 puff As Needed.), Disp: 13 g, Rfl: 1    triamterene-hydrochlorothiazide (MAXZIDE) 75-50 MG per tablet, Take 1 tablet by mouth Daily., Disp: , Rfl:     sodium-potassium-magnesium sulfates (Suprep Bowel Prep Kit) 17.5-3.13-1.6 GM/177ML solution oral solution, Take 2 bottles by mouth Take As Directed., Disp: 177 mL, Rfl: 0     Allergies     No Known Allergies    Social History       Social History     Social History Narrative    Not on file       Immunizations     Immunization:  Immunization History   Administered Date(s) Administered    COVID-19 (PFIZER) Purple Cap Monovalent 03/29/2021, 04/19/2021    Fluzone (or Fluarix & Flulaval for VFC) >6mos 10/01/2021, 10/25/2023    Tdap 06/28/2022          Objective     Objective     Vital Signs:   /95 (BP Location: Left arm, Patient Position: Sitting, Cuff Size: Adult)   Pulse 116   Ht 190.5 cm (75\")   Wt 103 kg (226 lb 3.2 oz)   BMI 28.27 kg/m²       Physical Exam    Results      Result Review :   The following data was reviewed by: KATIE Troncoso on 01/23/2025:    CBC w/diff          12/5/2024    12:19 12/30/2024    11:18 1/8/2025    09:08 1/8/2025    09:46   CBC w/Diff   WBC 6.61  8.16  4.68     RBC 5.04  3.08  3.24     Hemoglobin 14.3  8.6  8.6  10.5    Hematocrit 43.1  26.7  28.6     MCV 85.5  86.7  88.3     MCH 28.4  27.9  26.5     MCHC 33.2  32.2  30.1     RDW 12.8  13.5  13.7     Platelets 213  335  346     Neutrophil Rel % 58.7  61.1  59.2     Immature Granulocyte Rel % 0.2  0.5  0.2     Lymphocyte Rel % 29.8  28.7  25.2     Monocyte Rel % 8.9  7.8  12.4     Eosinophil Rel % 1.8  1.3  2.4     Basophil Rel % 0.6  0.6  0.6       CMP          12/5/2024    12:19 12/30/2024    11:18 1/8/2025    09:08   CMP   Glucose 168  120  98    BUN 18  10  10    Creatinine 1.46  1.27  1.29    EGFR 54.0  63.9  62.7    Sodium 135  139  140    Potassium 4.2  4.1  4.0    Chloride 100  102  105    Calcium 10.0  9.7  9.0    Total Protein 8.0  7.3  " 7.0    Albumin 4.0  4.3  3.8    Globulin 4.0  3.0  3.2    Total Bilirubin 0.4  0.2  0.2    Alkaline Phosphatase 87  80  86    AST (SGOT) 16  26  24    ALT (SGPT) 24  26  23    Albumin/Globulin Ratio 1.0  1.4  1.2    BUN/Creatinine Ratio 12.3  7.9  7.8    Anion Gap 8.7  10.0  11.6        Iron Profile   Iron   Date Value Ref Range Status   12/30/2024 54 (L) 59 - 158 mcg/dL Final     TIBC   Date Value Ref Range Status   12/30/2024 438 298 - 536 mcg/dL Final     Iron Saturation (TSAT)   Date Value Ref Range Status   12/30/2024 12 (L) 20 - 50 % Final     Transferrin   Date Value Ref Range Status   12/30/2024 294 200 - 360 mg/dL Final          Assessment and Plan        Assessment and Plan    Diagnoses and all orders for this visit:    1. Iron deficiency anemia, unspecified iron deficiency anemia type (Primary)  -     Case Request; Standing  -     Case Request    2. Weight loss, abnormal  -     Case Request; Standing  -     Case Request    Other orders  -     Follow Anesthesia Guidelines / Protocol; Future  -     Verify NPO; Standing  -     Verify Bowel Prep Was Successful; Standing  -     Give Tap Water Enema If Bowel Prep Insufficient; Standing  -     sodium-potassium-magnesium sulfates (Suprep Bowel Prep Kit) 17.5-3.13-1.6 GM/177ML solution oral solution; Take 2 bottles by mouth Take As Directed.  Dispense: 177 mL; Refill: 0        ESOPHAGOGASTRODUODENOSCOPY (N/A), COLONOSCOPY (N/A)  The risk of the endoscopy were discussed in detail. Possible risks/complications, benefits, and alternatives to surgical or invasive procedure have been explained to patient and/or legal guardian; risks include bleeding, infection, and perforation. Patient has been evaluated and can tolerate anesthesia and/or sedation.         Follow Up        Follow Up   Return in about 6 months (around 7/23/2025) for anemia and weight loss .  Patient was given instructions and counseling regarding his condition or for health maintenance advice. Please  see specific information pulled into the AVS if appropriate.

## 2025-01-24 NOTE — PRE-PROCEDURE INSTRUCTIONS
Reminded of arrival time at  1200 , Entrance C of the [facility] hospital. Instructed to bring or have a  over the age of 18 set up to drive you home the day of procedure.    Instructed on clear liquid diet the day before, nothing red or purple. Call with any questions about the prep or if in need of the prep.  Reminded them not to eat or drink anything am of procedure unless its a sip of water with medications.  Patient verbalized understanding. Hold diabetic meds, water pills  am of procedure.

## 2025-01-28 ENCOUNTER — TELEPHONE (OUTPATIENT)
Dept: GASTROENTEROLOGY | Facility: CLINIC | Age: 65
End: 2025-01-28
Payer: COMMERCIAL

## 2025-01-28 NOTE — TELEPHONE ENCOUNTER
Received voicemail from pt stating his bowel prep needs a PA    Submitted Pa KEY: [id number]    Pa approved pt is aware

## 2025-01-30 ENCOUNTER — ANESTHESIA EVENT (OUTPATIENT)
Dept: GASTROENTEROLOGY | Facility: HOSPITAL | Age: 65
End: 2025-01-30
Payer: COMMERCIAL

## 2025-01-30 ENCOUNTER — PATIENT ROUNDING (BHMG ONLY) (OUTPATIENT)
Dept: GASTROENTEROLOGY | Facility: CLINIC | Age: 65
End: 2025-01-30
Payer: COMMERCIAL

## 2025-01-30 NOTE — PROGRESS NOTES
"1/30/2025      Hello, may I speak with Eva Casas     My name is Ernestina. I am calling from [facility]. I show that you had a recent visit with KATIE Davis.    Before we get started may I verify your date of birth? 1960    I am calling to officially welcome you to our practice and ask about your recent visit. Is this a good time to talk?  Yes     Tell me about your visit with us. What things went well? \"It all went well, Vanessa answered all of my questions, everyone was really nice & the visit was quick\"    We strive to ensure that we protect your safety and privacy. Is there anything we could have done to improve this during your visit?    No     We're always looking for ways to make our patients' experiences even better. Do you have recommendations on ways we may improve?  No     Overall were you satisfied with your first visit to our practice?  Yes     I appreciate you taking the time to speak with me today. Is there anything else I can do for you?  No    I am glad to hear that you had a very good visit and I appreciate you taking the time to provide feedback on this call. We would greatly appreciate you filling out a survey if you receive one in the mail, email or text. This is a great opportunity to provide any additional feedback that you may think of after this call as well.       Thank you, and have a great day.   "

## 2025-01-30 NOTE — ANESTHESIA PREPROCEDURE EVALUATION
Anesthesia Evaluation     Patient summary reviewed and Nursing notes reviewed   NPO Solid Status: > 8 hours  NPO Liquid Status: > 4 hours           Airway   Mallampati: I  TM distance: >3 FB  Neck ROM: full  No difficulty expected  Dental - normal exam     Pulmonary - normal exam    breath sounds clear to auscultationShortness of breath: hx of dypsnea.  Cardiovascular - normal exam  Exercise tolerance: good (4-7 METS)    ECG reviewed  Rhythm: regular  Rate: normal    (+) hypertension well controlled      Neuro/Psych  (+) psychiatric history Depression  GI/Hepatic/Renal/Endo    (+) obesity, diabetes mellitus type 2 poorly controlled    Musculoskeletal     Abdominal    Substance History      OB/GYN          Other        ROS/Med Hx Other: ECG 12 Lead  Date/Time: 12/30/2024 6:28 PM  Performed by: Ani Coyle PA-C  Authorized by: Ani Coyle PA-C  Comparison: not compared with previous ECG   Rhythm: sinus rhythm  Rate: normal  Conduction: conduction normal  ST Segments: ST segments normal  QRS axis: normal  Clinical impression: normal ECG  Comments: Borderline T wave abnormalities in V2                     Anesthesia Plan    ASA 3     general   total IV anesthesia  (Patient understands anesthesia not responsible for dental damage. Risks explained including allergic reactions, BP, HR, O2 changes, aspiration, advanced airway placement. Pt verbalized understanding.)  intravenous induction     Anesthetic plan, risks, benefits, and alternatives have been provided, discussed and informed consent has been obtained with: patient.  Pre-procedure education provided  Plan discussed with CRNA.      CODE STATUS:

## 2025-01-31 ENCOUNTER — ANESTHESIA (OUTPATIENT)
Dept: GASTROENTEROLOGY | Facility: HOSPITAL | Age: 65
End: 2025-01-31
Payer: COMMERCIAL

## 2025-01-31 ENCOUNTER — HOSPITAL ENCOUNTER (OUTPATIENT)
Facility: HOSPITAL | Age: 65
Setting detail: HOSPITAL OUTPATIENT SURGERY
Discharge: HOME | End: 2025-01-31
Attending: INTERNAL MEDICINE | Admitting: INTERNAL MEDICINE
Payer: COMMERCIAL

## 2025-01-31 VITALS
OXYGEN SATURATION: 98 % | DIASTOLIC BLOOD PRESSURE: 70 MMHG | HEART RATE: 69 BPM | TEMPERATURE: 97.8 F | SYSTOLIC BLOOD PRESSURE: 107 MMHG | WEIGHT: 236.99 LBS | RESPIRATION RATE: 15 BRPM | BODY MASS INDEX: 29.62 KG/M2

## 2025-01-31 DIAGNOSIS — D50.9 IRON DEFICIENCY ANEMIA, UNSPECIFIED IRON DEFICIENCY ANEMIA TYPE: ICD-10-CM

## 2025-01-31 DIAGNOSIS — R63.4 WEIGHT LOSS, ABNORMAL: ICD-10-CM

## 2025-01-31 LAB — GLUCOSE BLDC GLUCOMTR-MCNC: 96 MG/DL (ref 70–99)

## 2025-01-31 PROCEDURE — 88305 TISSUE EXAM BY PATHOLOGIST: CPT | Performed by: INTERNAL MEDICINE

## 2025-01-31 PROCEDURE — 25810000003 LACTATED RINGERS PER 1000 ML: Performed by: NURSE ANESTHETIST, CERTIFIED REGISTERED

## 2025-01-31 PROCEDURE — 45385 COLONOSCOPY W/LESION REMOVAL: CPT | Performed by: INTERNAL MEDICINE

## 2025-01-31 PROCEDURE — 82948 REAGENT STRIP/BLOOD GLUCOSE: CPT | Performed by: NURSE ANESTHETIST, CERTIFIED REGISTERED

## 2025-01-31 PROCEDURE — 25010000002 PROPOFOL 10 MG/ML EMULSION: Performed by: NURSE ANESTHETIST, CERTIFIED REGISTERED

## 2025-01-31 PROCEDURE — 43239 EGD BIOPSY SINGLE/MULTIPLE: CPT | Performed by: INTERNAL MEDICINE

## 2025-01-31 PROCEDURE — 25010000002 LIDOCAINE PF 2% 2 % SOLUTION: Performed by: NURSE ANESTHETIST, CERTIFIED REGISTERED

## 2025-01-31 DEVICE — DEV CLIP ENDO RESOLUTION360 CONTRL ROT 235CM: Type: IMPLANTABLE DEVICE | Site: COLON | Status: FUNCTIONAL

## 2025-01-31 RX ORDER — PROPOFOL 10 MG/ML
VIAL (ML) INTRAVENOUS AS NEEDED
Status: DISCONTINUED | OUTPATIENT
Start: 2025-01-31 | End: 2025-01-31 | Stop reason: SURG

## 2025-01-31 RX ORDER — SODIUM CHLORIDE, SODIUM LACTATE, POTASSIUM CHLORIDE, CALCIUM CHLORIDE 600; 310; 30; 20 MG/100ML; MG/100ML; MG/100ML; MG/100ML
30 INJECTION, SOLUTION INTRAVENOUS CONTINUOUS
Status: DISCONTINUED | OUTPATIENT
Start: 2025-01-31 | End: 2025-01-31 | Stop reason: HOSPADM

## 2025-01-31 RX ORDER — LIDOCAINE HYDROCHLORIDE 20 MG/ML
INJECTION, SOLUTION EPIDURAL; INFILTRATION; INTRACAUDAL; PERINEURAL AS NEEDED
Status: DISCONTINUED | OUTPATIENT
Start: 2025-01-31 | End: 2025-01-31 | Stop reason: SURG

## 2025-01-31 RX ORDER — PHENYLEPHRINE HCL IN 0.9% NACL 1 MG/10 ML
SYRINGE (ML) INTRAVENOUS AS NEEDED
Status: DISCONTINUED | OUTPATIENT
Start: 2025-01-31 | End: 2025-01-31 | Stop reason: SURG

## 2025-01-31 RX ADMIN — PROPOFOL 200 MCG/KG/MIN: 10 INJECTION, EMULSION INTRAVENOUS at 12:42

## 2025-01-31 RX ADMIN — Medication 100 MCG: at 13:03

## 2025-01-31 RX ADMIN — PROPOFOL 100 MG: 10 INJECTION, EMULSION INTRAVENOUS at 12:42

## 2025-01-31 RX ADMIN — LIDOCAINE HYDROCHLORIDE 50 MG: 20 INJECTION, SOLUTION INTRAVENOUS at 12:42

## 2025-01-31 RX ADMIN — SODIUM CHLORIDE, POTASSIUM CHLORIDE, SODIUM LACTATE AND CALCIUM CHLORIDE 30 ML/HR: 600; 310; 30; 20 INJECTION, SOLUTION INTRAVENOUS at 12:29

## 2025-01-31 NOTE — H&P
Pre Procedure History & Physical    Chief Complaint:   Iron deficiency anemia, weight loss    Subjective     HPI:   63 yo M here for eval of iron deficiency anemia, weight loss.    Past Medical History:   Past Medical History:   Diagnosis Date    Borderline diabetic     Borderline high cholesterol     Gout     Hypertension        Past Surgical History:  History reviewed. No pertinent surgical history.    Family History:  History reviewed. No pertinent family history.    Social History:   reports that he has never smoked. He has never been exposed to tobacco smoke. He has never used smokeless tobacco. He reports that he does not drink alcohol and does not use drugs.    Medications:   Medications Prior to Admission   Medication Sig Dispense Refill Last Dose/Taking    allopurinol (ZYLOPRIM) 300 MG tablet Take 1 tablet by mouth Daily. 90 tablet 1     atorvastatin (LIPITOR) 10 MG tablet Take 1 tablet by mouth Daily.       cetirizine (zyrTEC) 10 MG tablet Take 1 tablet by mouth Daily for 30 days. 30 tablet 0     cholecalciferol (VITAMIN D3) 10 MCG (400 UNIT) tablet Take 1 tablet by mouth Daily.       colchicine 0.6 MG tablet Take 1 tablet by mouth Daily.       dapagliflozin Propanediol (Farxiga) 10 MG tablet Take 10 mg by mouth Daily. 30 tablet 2     Insulin Pen Needle 29G X 12.7MM misc Use 1 Pen Every Night. 90 each 1     losartan (COZAAR) 100 MG tablet Take 1 tablet by mouth Daily.       metFORMIN (GLUCOPHAGE) 1000 MG tablet Take 1 tablet by mouth 2 (Two) Times a Day With Meals for 30 days. 180 tablet 1     mometasone-formoterol (Dulera) 100-5 MCG/ACT inhaler Inhale 1 puff 2 (Two) Times a Day. (Patient taking differently: Inhale 1 puff As Needed.) 13 g 1     sodium-potassium-magnesium sulfates (Suprep Bowel Prep Kit) 17.5-3.13-1.6 GM/177ML solution oral solution Take 2 bottles by mouth Take As Directed. 177 mL 0     triamterene-hydrochlorothiazide (MAXZIDE) 75-50 MG per tablet Take 1 tablet by mouth Daily.           Allergies:  Patient has no known allergies.    ROS:    Pertinent items are noted in HPI     Objective     Weight 107 kg (236 lb 15.9 oz).    Physical Exam   Constitutional: Pt is oriented to person, place, and time and well-developed, well-nourished, and in no distress.   Mouth/Throat: Oropharynx is clear and moist.   Neck: Normal range of motion.   Cardiovascular: Normal rate, regular rhythm and normal heart sounds.    Pulmonary/Chest: Effort normal and breath sounds normal.   Abdominal: Soft. Nontender  Skin: Skin is warm and dry.   Psychiatric: Mood, memory, affect and judgment normal.     Assessment & Plan     Diagnosis:  Iron deficiency anemia, weight loss    Anticipated Surgical Procedure:  EGD/colonoscopy    The risks, benefits, and alternatives of this procedure have been discussed with the patient or the responsible party- the patient understands and agrees to proceed.

## 2025-01-31 NOTE — ANESTHESIA POSTPROCEDURE EVALUATION
Patient: Eva Casas    Procedure Summary       Date: 01/31/25 Room / Location: [facility] ENDOSCOPY 1 / [facility] ENDOSCOPY    Anesthesia Start: 1237 Anesthesia Stop: 1314    Procedures:       ESOPHAGOGASTRODUODENOSCOPY WITH BIOPSIES      COLONOSCOPY WITH POLYPECTOMY/SNARE/CLIP APPLICATION X1 Diagnosis:       Iron deficiency anemia, unspecified iron deficiency anemia type      Weight loss, abnormal      (Iron deficiency anemia, unspecified iron deficiency anemia type [D50.9])      (Weight loss, abnormal [R63.4])    Surgeons: Ebony White MD Provider: Reny Diaz CRNA    Anesthesia Type: general ASA Status: 3            Anesthesia Type: general    Vitals  Vitals Value Taken Time   /90 01/31/25 1331   Temp 36.6 °C (97.8 °F) 01/31/25 1316   Pulse 73 01/31/25 1336   Resp 15 01/31/25 1316   SpO2 91 % 01/31/25 1336   Vitals shown include unfiled device data.        Post Anesthesia Care and Evaluation    Post-procedure mental status: acceptable.  Pain management: satisfactory to patient    Airway patency: patent  Anesthetic complications: No anesthetic complications    Cardiovascular status: acceptable  Respiratory status: acceptable    Comments: Per chart review

## 2025-02-04 ENCOUNTER — TELEPHONE (OUTPATIENT)
Dept: GASTROENTEROLOGY | Facility: CLINIC | Age: 65
End: 2025-02-04
Payer: COMMERCIAL

## 2025-02-04 DIAGNOSIS — D50.9 IRON DEFICIENCY ANEMIA, UNSPECIFIED IRON DEFICIENCY ANEMIA TYPE: Primary | ICD-10-CM

## 2025-02-04 DIAGNOSIS — K20.90 ESOPHAGITIS: ICD-10-CM

## 2025-02-04 LAB
CYTO UR: NORMAL
LAB AP CASE REPORT: NORMAL
LAB AP CLINICAL INFORMATION: NORMAL
PATH REPORT.FINAL DX SPEC: NORMAL
PATH REPORT.GROSS SPEC: NORMAL

## 2025-02-04 RX ORDER — PANTOPRAZOLE SODIUM 20 MG/1
20 TABLET, DELAYED RELEASE ORAL DAILY
Qty: 90 TABLET | Refills: 1 | Status: SHIPPED | OUTPATIENT
Start: 2025-02-04

## 2025-02-04 NOTE — TELEPHONE ENCOUNTER
----- Message from Katherine Ross sent at 2/4/2025  2:55 PM EST -----  Biopsies are consistent with reflux esophagitis. Recommend treatment with PPI - rx sent.  Colon polyp benign.  Place in recall for repeat colonoscopy in 5 years.  Recommend repeat CBC and iron profile.  If DUSTIN still present, recommend capsule endoscopy for further evaluation.  Keep f/u.

## 2025-02-13 ENCOUNTER — LAB (OUTPATIENT)
Dept: LAB | Facility: HOSPITAL | Age: 65
End: 2025-02-13
Payer: COMMERCIAL

## 2025-02-13 DIAGNOSIS — D50.9 IRON DEFICIENCY ANEMIA, UNSPECIFIED IRON DEFICIENCY ANEMIA TYPE: ICD-10-CM

## 2025-02-13 LAB
BASOPHILS # BLD AUTO: 0.03 10*3/MM3 (ref 0–0.2)
BASOPHILS NFR BLD AUTO: 0.4 % (ref 0–1.5)
DEPRECATED RDW RBC AUTO: 42.8 FL (ref 37–54)
EOSINOPHIL # BLD AUTO: 0.14 10*3/MM3 (ref 0–0.4)
EOSINOPHIL NFR BLD AUTO: 2 % (ref 0.3–6.2)
ERYTHROCYTE [DISTWIDTH] IN BLOOD BY AUTOMATED COUNT: 14.6 % (ref 12.3–15.4)
HCT VFR BLD AUTO: 36.3 % (ref 37.5–51)
HGB BLD-MCNC: 11.1 G/DL (ref 13–17.7)
IMM GRANULOCYTES # BLD AUTO: 0.01 10*3/MM3 (ref 0–0.05)
IMM GRANULOCYTES NFR BLD AUTO: 0.1 % (ref 0–0.5)
IRON 24H UR-MRATE: 39 MCG/DL (ref 59–158)
IRON SATN MFR SERPL: 9 % (ref 20–50)
LYMPHOCYTES # BLD AUTO: 2.13 10*3/MM3 (ref 0.7–3.1)
LYMPHOCYTES NFR BLD AUTO: 29.7 % (ref 19.6–45.3)
MCH RBC QN AUTO: 25.2 PG (ref 26.6–33)
MCHC RBC AUTO-ENTMCNC: 30.6 G/DL (ref 31.5–35.7)
MCV RBC AUTO: 82.3 FL (ref 79–97)
MONOCYTES # BLD AUTO: 0.54 10*3/MM3 (ref 0.1–0.9)
MONOCYTES NFR BLD AUTO: 7.5 % (ref 5–12)
NEUTROPHILS NFR BLD AUTO: 4.32 10*3/MM3 (ref 1.7–7)
NEUTROPHILS NFR BLD AUTO: 60.3 % (ref 42.7–76)
NRBC BLD AUTO-RTO: 0 /100 WBC (ref 0–0.2)
PLATELET # BLD AUTO: 285 10*3/MM3 (ref 140–450)
PMV BLD AUTO: 11.2 FL (ref 6–12)
RBC # BLD AUTO: 4.41 10*6/MM3 (ref 4.14–5.8)
TIBC SERPL-MCNC: 414 MCG/DL (ref 298–536)
TRANSFERRIN SERPL-MCNC: 278 MG/DL (ref 200–360)
WBC NRBC COR # BLD AUTO: 7.17 10*3/MM3 (ref 3.4–10.8)

## 2025-02-13 PROCEDURE — 36415 COLL VENOUS BLD VENIPUNCTURE: CPT

## 2025-02-13 PROCEDURE — 85025 COMPLETE CBC W/AUTO DIFF WBC: CPT

## 2025-02-13 PROCEDURE — 84466 ASSAY OF TRANSFERRIN: CPT

## 2025-02-13 PROCEDURE — 83540 ASSAY OF IRON: CPT

## 2025-02-14 ENCOUNTER — TELEPHONE (OUTPATIENT)
Dept: OTHER | Facility: HOSPITAL | Age: 65
End: 2025-02-14
Payer: COMMERCIAL

## 2025-02-14 NOTE — TELEPHONE ENCOUNTER
----- Message from Katherine Ross sent at 2/14/2025  9:54 AM EST -----  Iron deficiency anemia present, but improved from before.  Proceed with EGD/Colonoscopy for further evaluation.

## 2025-02-18 ENCOUNTER — TELEPHONE (OUTPATIENT)
Dept: INTERNAL MEDICINE | Facility: CLINIC | Age: 65
End: 2025-02-18
Payer: COMMERCIAL

## 2025-02-19 ENCOUNTER — TELEMEDICINE (OUTPATIENT)
Dept: INTERNAL MEDICINE | Facility: CLINIC | Age: 65
End: 2025-02-19
Payer: COMMERCIAL

## 2025-02-19 DIAGNOSIS — K20.90 ESOPHAGITIS: ICD-10-CM

## 2025-02-19 DIAGNOSIS — K62.5 RECTAL BLEEDING: Primary | ICD-10-CM

## 2025-02-19 DIAGNOSIS — E53.8 B12 DEFICIENCY: ICD-10-CM

## 2025-02-19 DIAGNOSIS — D50.0 IRON DEFICIENCY ANEMIA DUE TO CHRONIC BLOOD LOSS: ICD-10-CM

## 2025-02-19 DIAGNOSIS — K59.00 CONSTIPATION, UNSPECIFIED CONSTIPATION TYPE: ICD-10-CM

## 2025-02-19 PROCEDURE — 99214 OFFICE O/P EST MOD 30 MIN: CPT | Performed by: PHYSICIAN ASSISTANT

## 2025-02-19 PROCEDURE — 1159F MED LIST DOCD IN RCRD: CPT | Performed by: PHYSICIAN ASSISTANT

## 2025-02-19 PROCEDURE — 1160F RVW MEDS BY RX/DR IN RCRD: CPT | Performed by: PHYSICIAN ASSISTANT

## 2025-02-19 RX ORDER — CYANOCOBALAMIN 1000 UG/ML
1000 INJECTION, SOLUTION INTRAMUSCULAR; SUBCUTANEOUS
Status: SHIPPED | OUTPATIENT
Start: 2025-02-19 | End: 2025-03-19

## 2025-02-19 RX ORDER — CYANOCOBALAMIN 1000 UG/ML
1000 INJECTION, SOLUTION INTRAMUSCULAR; SUBCUTANEOUS
Status: SHIPPED | OUTPATIENT
Start: 2025-02-19 | End: 2025-05-14

## 2025-02-19 NOTE — PROGRESS NOTES
"Chief Complaint  Follow up on rectal bleeding    Subjective          Eva Casas presents to [facility]  History of Present Illness  Patient authorizes the electronic transmission of medical information and/or videoconference session. Patient understands that he/she can still pursue face-to-face consultation if desired. Patient understands that as with any technology, telemedicine does have its limitations. There is no guarantee, therefore, that this telemedicine session will eliminate the need for patient to see a provider in person if the provider feels a physical exams or vital signs are neccessary to care for the patient. Patient understands and would like to proceed with telemedicine visit at this time.Patient agrees to participate in a telemedicine evaluation performed by Karishma Paniagua PA-C.    Pt current location: at his home  Provider current location: Internal medicine and pediatrics, [facility]    Had colonoscopy 1/31/25   Dx with esophagitis. Has been taking Pantoprazole  Denies heart burn   DUSTIN: Pt taking iron once daily   He has not had blood in stool since Dec 2024  Constipation: improved with metamucil   Now having bm daily, no straining.  He states he is feeling \"much betteR\" since hemoglobin has increased       Past Medical History:   Diagnosis Date    Borderline high cholesterol     Diabetes     Gout     Hypertension         Past Surgical History:   Procedure Laterality Date    COLONOSCOPY N/A 1/31/2025    Procedure: COLONOSCOPY WITH POLYPECTOMY/SNARE/CLIP APPLICATION X1;  Surgeon: Ebony White MD;  Location: [facility] ENDOSCOPY;  Service: Gastroenterology;  Laterality: N/A;  COLON POLYPS/DIVERTICULOSIS    ENDOSCOPY N/A 1/31/2025    Procedure: ESOPHAGOGASTRODUODENOSCOPY WITH BIOPSIES;  Surgeon: Ebony White MD;  Location: [facility] ENDOSCOPY;  Service: Gastroenterology;  Laterality: N/A;  HIATAL HERNIA/GASTRITIS "        Current Outpatient Medications on File Prior to Visit   Medication Sig Dispense Refill    allopurinol (ZYLOPRIM) 300 MG tablet Take 1 tablet by mouth Daily. 90 tablet 1    atorvastatin (LIPITOR) 10 MG tablet Take 1 tablet by mouth Daily.      cetirizine (zyrTEC) 10 MG tablet Take 1 tablet by mouth Daily for 30 days. 30 tablet 0    cholecalciferol (VITAMIN D3) 10 MCG (400 UNIT) tablet Take 1 tablet by mouth Daily.      colchicine 0.6 MG tablet Take 1 tablet by mouth Daily.      dapagliflozin Propanediol (Farxiga) 10 MG tablet Take 10 mg by mouth Daily. 30 tablet 2    losartan (COZAAR) 100 MG tablet Take 1 tablet by mouth Daily.      metFORMIN (GLUCOPHAGE) 1000 MG tablet Take 1 tablet by mouth 2 (Two) Times a Day With Meals for 30 days. 180 tablet 1    mometasone-formoterol (Dulera) 100-5 MCG/ACT inhaler Inhale 1 puff 2 (Two) Times a Day. (Patient taking differently: Inhale 1 puff As Needed.) 13 g 1    pantoprazole (Protonix) 20 MG EC tablet Take 1 tablet by mouth Daily. 90 tablet 1    triamterene-hydrochlorothiazide (MAXZIDE) 75-50 MG per tablet Take 1 tablet by mouth Daily.       No current facility-administered medications on file prior to visit.        No Known Allergies    Social History     Tobacco Use   Smoking Status Never    Passive exposure: Never   Smokeless Tobacco Never          Objective   Vital Signs:   There were no vitals taken for this visit.    Physical Exam  Constitutional:       Appearance: Normal appearance.   HENT:      Head: Normocephalic.   Pulmonary:      Effort: Pulmonary effort is normal.   Neurological:      Mental Status: He is alert and oriented to person, place, and time.   Psychiatric:         Mood and Affect: Mood normal.        Result Review :   The following data was reviewed by: Karishma Paniagua PA-C on 02/19/2025:  Common labs          12/30/2024    11:18 1/8/2025    09:08 1/8/2025    09:46 2/13/2025    10:07   Common Labs   Glucose 120  98      BUN 10  10      Creatinine  1.27  1.29      Sodium 139  140      Potassium 4.1  4.0      Chloride 102  105      Calcium 9.7  9.0      Albumin 4.3  3.8      Total Bilirubin 0.2  0.2      Alkaline Phosphatase 80  86      AST (SGOT) 26  24      ALT (SGPT) 26  23      WBC 8.16  4.68   7.17    Hemoglobin 8.6  8.6  10.5  11.1    Hematocrit 26.7  28.6   36.3    Platelets 335  346   285      Data reviewed : Radiologic studies ct abd pevl                     Assessment and Plan    Diagnoses and all orders for this visit:    1. Rectal bleeding (Primary)  Comments:  resolved  Orders:  -     CBC w AUTO Differential; Future    2. B12 deficiency  Comments:  Low/normal b12, will start b12 protocol  Orders:  -     cyanocobalamin injection 1,000 mcg  -     cyanocobalamin injection 1,000 mcg  -     Vitamin B12; Future  -     Folate; Future    3. Iron deficiency anemia due to chronic blood loss  Comments:  reviewed colonoscopy, egd, and recent labs. cont iron supplement, take with vit c to help absorption  Orders:  -     Ferritin; Future  -     Iron Profile; Future  -     CBC w AUTO Differential; Future    4. Esophagitis  Comments:  reviewed egd results. Continue PPI as rx    5. Constipation, unspecified constipation type  Comments:  Improved with Metamucil, continue medication at this time.  Increase water and fiber in diet.        Follow Up   Return in about 3 months (around 5/19/2025) for Next scheduled follow up.  Patient was given instructions and counseling regarding his condition or for health maintenance advice. Please see specific information pulled into the AVS if appropriate.

## 2025-02-20 ENCOUNTER — CLINICAL SUPPORT (OUTPATIENT)
Dept: INTERNAL MEDICINE | Facility: CLINIC | Age: 65
End: 2025-02-20
Payer: COMMERCIAL

## 2025-02-20 DIAGNOSIS — D50.0 IRON DEFICIENCY ANEMIA DUE TO CHRONIC BLOOD LOSS: Primary | ICD-10-CM

## 2025-02-20 PROCEDURE — 96372 THER/PROPH/DIAG INJ SC/IM: CPT | Performed by: STUDENT IN AN ORGANIZED HEALTH CARE EDUCATION/TRAINING PROGRAM

## 2025-02-20 RX ADMIN — CYANOCOBALAMIN 1000 MCG: 1000 INJECTION, SOLUTION INTRAMUSCULAR; SUBCUTANEOUS at 11:00

## 2025-02-20 NOTE — PROGRESS NOTES
Patient came into office for administration of B-12 injection; see eMAR for details.  Medication education provided for patient; tolerated injection well; left immediately following; no 15 min OBS.    Katherine Ozuna RN BSN  [facility]

## 2025-02-27 ENCOUNTER — CLINICAL SUPPORT (OUTPATIENT)
Dept: INTERNAL MEDICINE | Facility: CLINIC | Age: 65
End: 2025-02-27
Payer: COMMERCIAL

## 2025-02-27 DIAGNOSIS — D50.0 IRON DEFICIENCY ANEMIA DUE TO CHRONIC BLOOD LOSS: Primary | ICD-10-CM

## 2025-02-27 DIAGNOSIS — E11.65 TYPE 2 DIABETES MELLITUS WITH HYPERGLYCEMIA, WITHOUT LONG-TERM CURRENT USE OF INSULIN: ICD-10-CM

## 2025-02-27 RX ADMIN — CYANOCOBALAMIN 1000 MCG: 1000 INJECTION, SOLUTION INTRAMUSCULAR; SUBCUTANEOUS at 09:48

## 2025-02-27 NOTE — PROGRESS NOTES
Patient came into office for administration of B-12 injection; see eMAR for details.  Medication education provided for patient; tolerated injection well; left immediately following; no 15 min OBS.    Katherine Ozuna RN BSN  [facility] [place]

## 2025-03-06 ENCOUNTER — CLINICAL SUPPORT (OUTPATIENT)
Dept: INTERNAL MEDICINE | Facility: CLINIC | Age: 65
End: 2025-03-06
Payer: COMMERCIAL

## 2025-03-06 DIAGNOSIS — E53.8 VITAMIN B12 DEFICIENCY: Primary | ICD-10-CM

## 2025-03-06 RX ADMIN — CYANOCOBALAMIN 1000 MCG: 1000 INJECTION, SOLUTION INTRAMUSCULAR; SUBCUTANEOUS at 10:00

## 2025-03-06 NOTE — PROGRESS NOTES
Patient came into office for administration of B-12 injection; see eMAR for details.  Medication education provided for patient; tolerated injection well; left immediately following; no 15 min OBS.

## 2025-03-13 ENCOUNTER — CLINICAL SUPPORT (OUTPATIENT)
Dept: INTERNAL MEDICINE | Facility: CLINIC | Age: 65
End: 2025-03-13
Payer: COMMERCIAL

## 2025-03-13 DIAGNOSIS — D50.0 IRON DEFICIENCY ANEMIA DUE TO CHRONIC BLOOD LOSS: Primary | ICD-10-CM

## 2025-03-13 DIAGNOSIS — E11.65 TYPE 2 DIABETES MELLITUS WITH HYPERGLYCEMIA, WITHOUT LONG-TERM CURRENT USE OF INSULIN: ICD-10-CM

## 2025-03-13 PROCEDURE — 96372 THER/PROPH/DIAG INJ SC/IM: CPT | Performed by: PHYSICIAN ASSISTANT

## 2025-03-13 RX ADMIN — CYANOCOBALAMIN 1000 MCG: 1000 INJECTION, SOLUTION INTRAMUSCULAR; SUBCUTANEOUS at 10:16

## 2025-03-13 NOTE — PROGRESS NOTES
Patient came into office for administration of B-12 injection; see eMAR for details.  Medication education provided for patient; tolerated injection well; left immediately following; no 15 min OBS.    Katherine Ozuna RN BSN  [facility]

## 2025-04-14 ENCOUNTER — CLINICAL SUPPORT (OUTPATIENT)
Dept: INTERNAL MEDICINE | Facility: CLINIC | Age: 65
End: 2025-04-14
Payer: COMMERCIAL

## 2025-04-14 DIAGNOSIS — E53.8 B12 DEFICIENCY: Primary | ICD-10-CM

## 2025-04-14 PROCEDURE — 96372 THER/PROPH/DIAG INJ SC/IM: CPT | Performed by: STUDENT IN AN ORGANIZED HEALTH CARE EDUCATION/TRAINING PROGRAM

## 2025-04-14 RX ADMIN — CYANOCOBALAMIN 1000 MCG: 1000 INJECTION, SOLUTION INTRAMUSCULAR; SUBCUTANEOUS at 09:58

## 2025-04-14 NOTE — PROGRESS NOTES
Patient came into office for administration of B-12 injection; see eMAR for details.  Medication education provided for patient; tolerated injection well; left immediately following; no 15 min OBS.    Katherine Ozuna RN BSN  [facility]

## 2025-05-13 ENCOUNTER — CLINICAL SUPPORT (OUTPATIENT)
Dept: INTERNAL MEDICINE | Facility: CLINIC | Age: 65
End: 2025-05-13
Payer: COMMERCIAL

## 2025-05-13 DIAGNOSIS — E11.65 TYPE 2 DIABETES MELLITUS WITH HYPERGLYCEMIA, WITHOUT LONG-TERM CURRENT USE OF INSULIN: Primary | ICD-10-CM

## 2025-05-13 DIAGNOSIS — K62.5 RECTAL BLEEDING: ICD-10-CM

## 2025-05-13 DIAGNOSIS — E53.8 B12 DEFICIENCY: ICD-10-CM

## 2025-05-13 DIAGNOSIS — D50.0 IRON DEFICIENCY ANEMIA DUE TO CHRONIC BLOOD LOSS: ICD-10-CM

## 2025-05-13 LAB
BASOPHILS # BLD AUTO: 0.05 10*3/MM3 (ref 0–0.2)
BASOPHILS NFR BLD AUTO: 0.8 % (ref 0–1.5)
DEPRECATED RDW RBC AUTO: 46.6 FL (ref 37–54)
EOSINOPHIL # BLD AUTO: 0.08 10*3/MM3 (ref 0–0.4)
EOSINOPHIL NFR BLD AUTO: 1.2 % (ref 0.3–6.2)
ERYTHROCYTE [DISTWIDTH] IN BLOOD BY AUTOMATED COUNT: 16.5 % (ref 12.3–15.4)
FERRITIN SERPL-MCNC: 38.1 NG/ML (ref 30–400)
FOLATE SERPL-MCNC: 13.7 NG/ML (ref 4.78–24.2)
HCT VFR BLD AUTO: 46.1 % (ref 37.5–51)
HGB BLD-MCNC: 14.8 G/DL (ref 13–17.7)
IMM GRANULOCYTES # BLD AUTO: 0.02 10*3/MM3 (ref 0–0.05)
IMM GRANULOCYTES NFR BLD AUTO: 0.3 % (ref 0–0.5)
IRON 24H UR-MRATE: 72 MCG/DL (ref 59–158)
IRON SATN MFR SERPL: 16 % (ref 20–50)
LYMPHOCYTES # BLD AUTO: 2.34 10*3/MM3 (ref 0.7–3.1)
LYMPHOCYTES NFR BLD AUTO: 35.2 % (ref 19.6–45.3)
MCH RBC QN AUTO: 25.5 PG (ref 26.6–33)
MCHC RBC AUTO-ENTMCNC: 32.1 G/DL (ref 31.5–35.7)
MCV RBC AUTO: 79.3 FL (ref 79–97)
MONOCYTES # BLD AUTO: 0.55 10*3/MM3 (ref 0.1–0.9)
MONOCYTES NFR BLD AUTO: 8.3 % (ref 5–12)
NEUTROPHILS NFR BLD AUTO: 3.6 10*3/MM3 (ref 1.7–7)
NEUTROPHILS NFR BLD AUTO: 54.2 % (ref 42.7–76)
NRBC BLD AUTO-RTO: 0 /100 WBC (ref 0–0.2)
PLATELET # BLD AUTO: 240 10*3/MM3 (ref 140–450)
PMV BLD AUTO: 10.6 FL (ref 6–12)
RBC # BLD AUTO: 5.81 10*6/MM3 (ref 4.14–5.8)
TIBC SERPL-MCNC: 437 MCG/DL (ref 298–536)
TRANSFERRIN SERPL-MCNC: 293 MG/DL (ref 200–360)
VIT B12 BLD-MCNC: 772 PG/ML (ref 211–946)
WBC NRBC COR # BLD AUTO: 6.64 10*3/MM3 (ref 3.4–10.8)

## 2025-05-13 PROCEDURE — 85025 COMPLETE CBC W/AUTO DIFF WBC: CPT | Performed by: PHYSICIAN ASSISTANT

## 2025-05-13 PROCEDURE — 82607 VITAMIN B-12: CPT | Performed by: PHYSICIAN ASSISTANT

## 2025-05-13 PROCEDURE — 84466 ASSAY OF TRANSFERRIN: CPT | Performed by: PHYSICIAN ASSISTANT

## 2025-05-13 PROCEDURE — 82746 ASSAY OF FOLIC ACID SERUM: CPT | Performed by: PHYSICIAN ASSISTANT

## 2025-05-13 PROCEDURE — 36415 COLL VENOUS BLD VENIPUNCTURE: CPT | Performed by: STUDENT IN AN ORGANIZED HEALTH CARE EDUCATION/TRAINING PROGRAM

## 2025-05-13 PROCEDURE — 82728 ASSAY OF FERRITIN: CPT | Performed by: PHYSICIAN ASSISTANT

## 2025-05-13 PROCEDURE — 83540 ASSAY OF IRON: CPT | Performed by: PHYSICIAN ASSISTANT

## 2025-05-13 PROCEDURE — 96372 THER/PROPH/DIAG INJ SC/IM: CPT | Performed by: STUDENT IN AN ORGANIZED HEALTH CARE EDUCATION/TRAINING PROGRAM

## 2025-05-13 RX ADMIN — CYANOCOBALAMIN 1000 MCG: 1000 INJECTION, SOLUTION INTRAMUSCULAR; SUBCUTANEOUS at 10:50

## 2025-05-13 NOTE — PROGRESS NOTES
Patient came into office for administration of B-12 injection; see eMAR for details.  Medication education provided for patient; tolerated injection well; left immediately following; no 15 min OBS.      Venipuncture Blood Specimen Collection  Venipuncture performed in [facility] by Katherine Ozuna RN with good hemostasis. Patient tolerated the procedure well without complications.   05/13/25   Katherine Ozuna RN

## 2025-06-06 RX ORDER — CYANOCOBALAMIN 1000 UG/ML
1000 INJECTION, SOLUTION INTRAMUSCULAR; SUBCUTANEOUS
Status: SHIPPED | OUTPATIENT
Start: 2025-06-13

## 2025-06-13 ENCOUNTER — CLINICAL SUPPORT (OUTPATIENT)
Dept: INTERNAL MEDICINE | Facility: CLINIC | Age: 65
End: 2025-06-13
Payer: COMMERCIAL

## 2025-06-13 DIAGNOSIS — D50.0 IRON DEFICIENCY ANEMIA DUE TO CHRONIC BLOOD LOSS: ICD-10-CM

## 2025-06-13 DIAGNOSIS — E53.8 B12 DEFICIENCY: Primary | ICD-10-CM

## 2025-06-13 PROCEDURE — 96372 THER/PROPH/DIAG INJ SC/IM: CPT | Performed by: STUDENT IN AN ORGANIZED HEALTH CARE EDUCATION/TRAINING PROGRAM

## 2025-06-13 RX ADMIN — CYANOCOBALAMIN 1000 MCG: 1000 INJECTION, SOLUTION INTRAMUSCULAR; SUBCUTANEOUS at 10:10

## 2025-06-13 NOTE — PROGRESS NOTES
Patient came into office for administration of B-12 injection; see eMAR for details.  Medication education provided for patient; tolerated injection well; left immediately following; no 15 min OBS.    Katherine Ozuna RN BSN  [facility]

## 2025-07-14 ENCOUNTER — CLINICAL SUPPORT (OUTPATIENT)
Dept: INTERNAL MEDICINE | Facility: CLINIC | Age: 65
End: 2025-07-14
Payer: COMMERCIAL

## 2025-07-14 DIAGNOSIS — D50.0 IRON DEFICIENCY ANEMIA DUE TO CHRONIC BLOOD LOSS: Primary | ICD-10-CM

## 2025-07-14 DIAGNOSIS — E11.65 TYPE 2 DIABETES MELLITUS WITH HYPERGLYCEMIA, WITHOUT LONG-TERM CURRENT USE OF INSULIN: ICD-10-CM

## 2025-07-14 PROCEDURE — 96372 THER/PROPH/DIAG INJ SC/IM: CPT | Performed by: STUDENT IN AN ORGANIZED HEALTH CARE EDUCATION/TRAINING PROGRAM

## 2025-07-14 RX ADMIN — CYANOCOBALAMIN 1000 MCG: 1000 INJECTION, SOLUTION INTRAMUSCULAR; SUBCUTANEOUS at 10:32

## 2025-07-14 NOTE — PROGRESS NOTES
Patient came into office for administration of B-12 injection; see eMAR for details.  Medication education provided for patient; tolerated injection well; left immediately following; no 15 min OBS.    Katherine Ozuna RN BSN  [facility]

## 2025-08-04 RX ORDER — PANTOPRAZOLE SODIUM 20 MG/1
20 TABLET, DELAYED RELEASE ORAL DAILY
Qty: 90 TABLET | Refills: 1 | Status: SHIPPED | OUTPATIENT
Start: 2025-08-04

## 2025-08-12 ENCOUNTER — OFFICE VISIT (OUTPATIENT)
Dept: GASTROENTEROLOGY | Facility: CLINIC | Age: 65
End: 2025-08-12
Payer: COMMERCIAL

## 2025-08-12 VITALS
DIASTOLIC BLOOD PRESSURE: 89 MMHG | HEIGHT: 75 IN | WEIGHT: 262.2 LBS | BODY MASS INDEX: 32.6 KG/M2 | HEART RATE: 69 BPM | SYSTOLIC BLOOD PRESSURE: 145 MMHG

## 2025-08-12 DIAGNOSIS — K29.50 CHRONIC GASTRITIS WITHOUT BLEEDING, UNSPECIFIED GASTRITIS TYPE: Primary | ICD-10-CM

## 2025-08-12 DIAGNOSIS — K20.90 ESOPHAGITIS: ICD-10-CM

## 2025-08-12 RX ORDER — ASPIRIN 81 MG/1
81 TABLET ORAL
COMMUNITY
Start: 2025-07-28

## 2025-08-14 ENCOUNTER — CLINICAL SUPPORT (OUTPATIENT)
Dept: INTERNAL MEDICINE | Facility: CLINIC | Age: 65
End: 2025-08-14
Payer: COMMERCIAL

## 2025-08-14 DIAGNOSIS — E53.8 B12 DEFICIENCY: Primary | ICD-10-CM

## 2025-08-14 DIAGNOSIS — E11.65 TYPE 2 DIABETES MELLITUS WITH HYPERGLYCEMIA, WITHOUT LONG-TERM CURRENT USE OF INSULIN: ICD-10-CM

## 2025-08-14 PROCEDURE — 96372 THER/PROPH/DIAG INJ SC/IM: CPT | Performed by: STUDENT IN AN ORGANIZED HEALTH CARE EDUCATION/TRAINING PROGRAM

## 2025-08-14 RX ADMIN — CYANOCOBALAMIN 1000 MCG: 1000 INJECTION, SOLUTION INTRAMUSCULAR; SUBCUTANEOUS at 10:12

## (undated) DEVICE — CONN JET HYDRA H20 AUXILIARY DISP

## (undated) DEVICE — Device

## (undated) DEVICE — THE SINGLE USE ETRAP – POLYP TRAP IS USED FOR SUCTION RETRIEVAL OF ENDOSCOPICALLY REMOVED POLYPS.: Brand: ETRAP

## (undated) DEVICE — SNAR POLYP CAPTIFLEX XS/OVL 11X2.4MM 240CM 1P/U

## (undated) DEVICE — SOL IRRG H2O PL/BG 1000ML STRL

## (undated) DEVICE — SOLIDIFIER LIQLOC PLS 1500CC BT

## (undated) DEVICE — LINER SURG CANSTR SXN S/RIGD 1500CC

## (undated) DEVICE — THE STERILE LIGHT HANDLE COVER IS USED WITH STERIS SURGICAL LIGHTING AND VISUALIZATION SYSTEMS.

## (undated) DEVICE — BLCK/BITE BLOX WO/DENTL/RIM W/STRAP 54F

## (undated) DEVICE — Device: Brand: DEFENDO AIR/WATER/SUCTION AND BIOPSY VALVE